# Patient Record
Sex: MALE | Race: WHITE | Employment: OTHER | URBAN - METROPOLITAN AREA
[De-identification: names, ages, dates, MRNs, and addresses within clinical notes are randomized per-mention and may not be internally consistent; named-entity substitution may affect disease eponyms.]

---

## 2021-10-06 ENCOUNTER — HOSPITAL ENCOUNTER (OUTPATIENT)
Age: 61
Setting detail: OBSERVATION
LOS: 1 days | Discharge: HOME | End: 2021-10-06
Attending: STUDENT IN AN ORGANIZED HEALTH CARE EDUCATION/TRAINING PROGRAM | Admitting: INTERNAL MEDICINE
Payer: COMMERCIAL

## 2021-10-06 ENCOUNTER — APPOINTMENT (OUTPATIENT)
Dept: NON INVASIVE DIAGNOSTICS | Age: 61
End: 2021-10-06
Attending: INTERNAL MEDICINE
Payer: COMMERCIAL

## 2021-10-06 ENCOUNTER — APPOINTMENT (OUTPATIENT)
Dept: MRI IMAGING | Age: 61
End: 2021-10-06
Attending: PSYCHIATRY & NEUROLOGY
Payer: COMMERCIAL

## 2021-10-06 ENCOUNTER — APPOINTMENT (OUTPATIENT)
Dept: GENERAL RADIOLOGY | Age: 61
End: 2021-10-06
Attending: PHYSICIAN ASSISTANT
Payer: COMMERCIAL

## 2021-10-06 ENCOUNTER — APPOINTMENT (OUTPATIENT)
Dept: GENERAL RADIOLOGY | Age: 61
End: 2021-10-06
Attending: INTERNAL MEDICINE
Payer: COMMERCIAL

## 2021-10-06 ENCOUNTER — APPOINTMENT (OUTPATIENT)
Dept: CT IMAGING | Age: 61
End: 2021-10-06
Attending: PHYSICIAN ASSISTANT
Payer: COMMERCIAL

## 2021-10-06 ENCOUNTER — APPOINTMENT (OUTPATIENT)
Dept: MRI IMAGING | Age: 61
End: 2021-10-06
Attending: STUDENT IN AN ORGANIZED HEALTH CARE EDUCATION/TRAINING PROGRAM
Payer: COMMERCIAL

## 2021-10-06 VITALS
RESPIRATION RATE: 18 BRPM | SYSTOLIC BLOOD PRESSURE: 118 MMHG | OXYGEN SATURATION: 97 % | DIASTOLIC BLOOD PRESSURE: 79 MMHG | TEMPERATURE: 98.1 F | HEIGHT: 66 IN | BODY MASS INDEX: 32.14 KG/M2 | WEIGHT: 200 LBS | HEART RATE: 86 BPM

## 2021-10-06 PROBLEM — R29.898 LEFT ARM WEAKNESS: Status: ACTIVE | Noted: 2021-10-06

## 2021-10-06 PROBLEM — G45.9 TIA (TRANSIENT ISCHEMIC ATTACK): Status: RESOLVED | Noted: 2021-10-06 | Resolved: 2021-10-06

## 2021-10-06 PROBLEM — G45.9 TIA (TRANSIENT ISCHEMIC ATTACK): Status: ACTIVE | Noted: 2021-10-06

## 2021-10-06 LAB
ALBUMIN SERPL-MCNC: 3.6 G/DL (ref 3.4–5)
ALBUMIN/GLOB SERPL: 1 {RATIO} (ref 0.8–1.7)
ALP SERPL-CCNC: 50 U/L (ref 45–117)
ALT SERPL-CCNC: 22 U/L (ref 16–61)
ANION GAP SERPL CALC-SCNC: 6 MMOL/L (ref 3–18)
AST SERPL-CCNC: 16 U/L (ref 10–38)
ATRIAL RATE: 101 BPM
BASOPHILS # BLD: 0.1 K/UL (ref 0–0.1)
BASOPHILS NFR BLD: 1 % (ref 0–2)
BILIRUB SERPL-MCNC: 0.3 MG/DL (ref 0.2–1)
BUN SERPL-MCNC: 17 MG/DL (ref 7–18)
BUN/CREAT SERPL: 13 (ref 12–20)
CALCIUM SERPL-MCNC: 9.2 MG/DL (ref 8.5–10.1)
CALCULATED R AXIS, ECG10: 6 DEGREES
CALCULATED T AXIS, ECG11: 20 DEGREES
CHLORIDE SERPL-SCNC: 102 MMOL/L (ref 100–111)
CK MB CFR SERPL CALC: 1.5 % (ref 0–4)
CK MB SERPL-MCNC: 2 NG/ML (ref 5–25)
CK SERPL-CCNC: 130 U/L (ref 39–308)
CO2 SERPL-SCNC: 31 MMOL/L (ref 21–32)
CREAT SERPL-MCNC: 1.26 MG/DL (ref 0.6–1.3)
DIAGNOSIS, 93000: NORMAL
DIFFERENTIAL METHOD BLD: ABNORMAL
EOSINOPHIL # BLD: 0.3 K/UL (ref 0–0.4)
EOSINOPHIL NFR BLD: 4 % (ref 0–5)
ERYTHROCYTE [DISTWIDTH] IN BLOOD BY AUTOMATED COUNT: 12.4 % (ref 11.6–14.5)
GLOBULIN SER CALC-MCNC: 3.6 G/DL (ref 2–4)
GLUCOSE BLD STRIP.AUTO-MCNC: 112 MG/DL (ref 70–110)
GLUCOSE SERPL-MCNC: 107 MG/DL (ref 74–99)
HCT VFR BLD AUTO: 46.4 % (ref 36–48)
HGB BLD-MCNC: 16 G/DL (ref 13–16)
INR PPP: 1.1 (ref 0.8–1.2)
LYMPHOCYTES # BLD: 2 K/UL (ref 0.9–3.6)
LYMPHOCYTES NFR BLD: 32 % (ref 21–52)
MCH RBC QN AUTO: 32.1 PG (ref 24–34)
MCHC RBC AUTO-ENTMCNC: 34.5 G/DL (ref 31–37)
MCV RBC AUTO: 93 FL (ref 78–100)
MONOCYTES # BLD: 0.7 K/UL (ref 0.05–1.2)
MONOCYTES NFR BLD: 11 % (ref 3–10)
NEUTS SEG # BLD: 3.4 K/UL (ref 1.8–8)
NEUTS SEG NFR BLD: 53 % (ref 40–73)
PLATELET # BLD AUTO: 276 K/UL (ref 135–420)
PMV BLD AUTO: 9.7 FL (ref 9.2–11.8)
POTASSIUM SERPL-SCNC: 4 MMOL/L (ref 3.5–5.5)
PROT SERPL-MCNC: 7.2 G/DL (ref 6.4–8.2)
PROTHROMBIN TIME: 14 SEC (ref 11.5–15.2)
Q-T INTERVAL, ECG07: 330 MS
QRS DURATION, ECG06: 82 MS
QTC CALCULATION (BEZET), ECG08: 416 MS
RBC # BLD AUTO: 4.99 M/UL (ref 4.35–5.65)
SODIUM SERPL-SCNC: 139 MMOL/L (ref 136–145)
TROPONIN I SERPL-MCNC: <0.02 NG/ML (ref 0–0.04)
VENTRICULAR RATE, ECG03: 96 BPM
WBC # BLD AUTO: 6.4 K/UL (ref 4.6–13.2)

## 2021-10-06 PROCEDURE — 96376 TX/PRO/DX INJ SAME DRUG ADON: CPT

## 2021-10-06 PROCEDURE — 99219 PR INITIAL OBSERVATION CARE/DAY 50 MINUTES: CPT | Performed by: INTERNAL MEDICINE

## 2021-10-06 PROCEDURE — 99285 EMERGENCY DEPT VISIT HI MDM: CPT

## 2021-10-06 PROCEDURE — 99218 HC RM OBSERVATION: CPT

## 2021-10-06 PROCEDURE — 71045 X-RAY EXAM CHEST 1 VIEW: CPT

## 2021-10-06 PROCEDURE — 72125 CT NECK SPINE W/O DYE: CPT

## 2021-10-06 PROCEDURE — 99218 PR INITIAL OBSERVATION CARE/DAY 30 MINUTES: CPT | Performed by: PSYCHIATRY & NEUROLOGY

## 2021-10-06 PROCEDURE — 74011250636 HC RX REV CODE- 250/636: Performed by: PSYCHIATRY & NEUROLOGY

## 2021-10-06 PROCEDURE — 82553 CREATINE MB FRACTION: CPT

## 2021-10-06 PROCEDURE — 99239 HOSP IP/OBS DSCHRG MGMT >30: CPT | Performed by: INTERNAL MEDICINE

## 2021-10-06 PROCEDURE — 82962 GLUCOSE BLOOD TEST: CPT

## 2021-10-06 PROCEDURE — 73030 X-RAY EXAM OF SHOULDER: CPT

## 2021-10-06 PROCEDURE — 72141 MRI NECK SPINE W/O DYE: CPT

## 2021-10-06 PROCEDURE — 80053 COMPREHEN METABOLIC PANEL: CPT

## 2021-10-06 PROCEDURE — 74011250636 HC RX REV CODE- 250/636: Performed by: ORTHOPAEDIC SURGERY

## 2021-10-06 PROCEDURE — 85610 PROTHROMBIN TIME: CPT

## 2021-10-06 PROCEDURE — 70450 CT HEAD/BRAIN W/O DYE: CPT

## 2021-10-06 PROCEDURE — 74011000250 HC RX REV CODE- 250: Performed by: INTERNAL MEDICINE

## 2021-10-06 PROCEDURE — 93306 TTE W/DOPPLER COMPLETE: CPT

## 2021-10-06 PROCEDURE — 70551 MRI BRAIN STEM W/O DYE: CPT

## 2021-10-06 PROCEDURE — 96375 TX/PRO/DX INJ NEW DRUG ADDON: CPT

## 2021-10-06 PROCEDURE — 85025 COMPLETE CBC W/AUTO DIFF WBC: CPT

## 2021-10-06 PROCEDURE — 93005 ELECTROCARDIOGRAM TRACING: CPT

## 2021-10-06 RX ORDER — LIDOCAINE 4 G/100G
PATCH TOPICAL
Qty: 5 PATCH | Refills: 0 | Status: SHIPPED | OUTPATIENT
Start: 2021-10-06 | End: 2021-10-20

## 2021-10-06 RX ORDER — DEXAMETHASONE SODIUM PHOSPHATE 4 MG/ML
6 INJECTION, SOLUTION INTRA-ARTICULAR; INTRALESIONAL; INTRAMUSCULAR; INTRAVENOUS; SOFT TISSUE ONCE
Status: COMPLETED | OUTPATIENT
Start: 2021-10-06 | End: 2021-10-06

## 2021-10-06 RX ORDER — GUAIFENESIN 100 MG/5ML
81 LIQUID (ML) ORAL DAILY
Status: DISCONTINUED | OUTPATIENT
Start: 2021-10-07 | End: 2021-10-06 | Stop reason: HOSPADM

## 2021-10-06 RX ORDER — ACETAMINOPHEN 325 MG/1
650 TABLET ORAL
Status: DISCONTINUED | OUTPATIENT
Start: 2021-10-06 | End: 2021-10-06 | Stop reason: HOSPADM

## 2021-10-06 RX ORDER — SODIUM CHLORIDE 9 MG/ML
10 INJECTION INTRAMUSCULAR; INTRAVENOUS; SUBCUTANEOUS
Status: COMPLETED | OUTPATIENT
Start: 2021-10-06 | End: 2021-10-06

## 2021-10-06 RX ORDER — ATORVASTATIN CALCIUM 10 MG/1
10 TABLET, FILM COATED ORAL
Status: DISCONTINUED | OUTPATIENT
Start: 2021-10-06 | End: 2021-10-06 | Stop reason: HOSPADM

## 2021-10-06 RX ORDER — SODIUM CHLORIDE 0.9 % (FLUSH) 0.9 %
5-40 SYRINGE (ML) INJECTION EVERY 8 HOURS
Status: DISCONTINUED | OUTPATIENT
Start: 2021-10-06 | End: 2021-10-06 | Stop reason: HOSPADM

## 2021-10-06 RX ORDER — DEXAMETHASONE 4 MG/1
4 TABLET ORAL 2 TIMES DAILY WITH MEALS
Qty: 10 TABLET | Refills: 0 | Status: SHIPPED | OUTPATIENT
Start: 2021-10-06 | End: 2021-10-20

## 2021-10-06 RX ORDER — ONDANSETRON 4 MG/1
4 TABLET, ORALLY DISINTEGRATING ORAL
Status: DISCONTINUED | OUTPATIENT
Start: 2021-10-06 | End: 2021-10-06 | Stop reason: HOSPADM

## 2021-10-06 RX ORDER — SODIUM CHLORIDE 0.9 % (FLUSH) 0.9 %
5-40 SYRINGE (ML) INJECTION AS NEEDED
Status: DISCONTINUED | OUTPATIENT
Start: 2021-10-06 | End: 2021-10-06 | Stop reason: HOSPADM

## 2021-10-06 RX ORDER — LIDOCAINE 4 G/100G
1 PATCH TOPICAL EVERY 24 HOURS
Status: DISCONTINUED | OUTPATIENT
Start: 2021-10-06 | End: 2021-10-06 | Stop reason: HOSPADM

## 2021-10-06 RX ORDER — ACETAMINOPHEN 650 MG/1
650 SUPPOSITORY RECTAL
Status: DISCONTINUED | OUTPATIENT
Start: 2021-10-06 | End: 2021-10-06 | Stop reason: HOSPADM

## 2021-10-06 RX ORDER — ONDANSETRON 2 MG/ML
4 INJECTION INTRAMUSCULAR; INTRAVENOUS
Status: DISCONTINUED | OUTPATIENT
Start: 2021-10-06 | End: 2021-10-06 | Stop reason: HOSPADM

## 2021-10-06 RX ORDER — DEXAMETHASONE SODIUM PHOSPHATE 4 MG/ML
6 INJECTION, SOLUTION INTRA-ARTICULAR; INTRALESIONAL; INTRAMUSCULAR; INTRAVENOUS; SOFT TISSUE EVERY 6 HOURS
Status: DISCONTINUED | OUTPATIENT
Start: 2021-10-06 | End: 2021-10-06 | Stop reason: HOSPADM

## 2021-10-06 RX ORDER — ENOXAPARIN SODIUM 100 MG/ML
40 INJECTION SUBCUTANEOUS DAILY
Status: DISCONTINUED | OUTPATIENT
Start: 2021-10-07 | End: 2021-10-06 | Stop reason: HOSPADM

## 2021-10-06 RX ORDER — POLYETHYLENE GLYCOL 3350 17 G/17G
17 POWDER, FOR SOLUTION ORAL DAILY PRN
Status: DISCONTINUED | OUTPATIENT
Start: 2021-10-06 | End: 2021-10-06 | Stop reason: HOSPADM

## 2021-10-06 RX ADMIN — DEXAMETHASONE SODIUM PHOSPHATE 6 MG: 4 INJECTION, SOLUTION INTRAMUSCULAR; INTRAVENOUS at 14:11

## 2021-10-06 RX ADMIN — SODIUM CHLORIDE 10 ML: 9 INJECTION INTRAMUSCULAR; INTRAVENOUS; SUBCUTANEOUS at 16:22

## 2021-10-06 RX ADMIN — DEXAMETHASONE SODIUM PHOSPHATE 6 MG: 4 INJECTION, SOLUTION INTRAMUSCULAR; INTRAVENOUS at 11:35

## 2021-10-06 NOTE — ED NOTES
L shoulder pain, L arm numbness/tingling, reduced ROM of joint. Denies dizziness, changes in vision, chest pain, shortness of breath, lower extremity weakness/numbness or tingling. Alert and oriented x 3, no slurred speech/facial droop.  strength 5/5. I performed a brief evaluation, including history and physical, of the patient here in triage and I have determined that pt will need further treatment and evaluation from the main side ER physician. I have placed initial orders to help in expediting patients care.      October 06, 2021 at 12:55 AM - JUVENTINO Rodriguez        Visit Vitals  BP (!) 139/101   Pulse (!) 105   Temp 98 °F (36.7 °C)   Resp 18   Ht 5' 6\" (1.676 m)   Wt 90.7 kg (200 lb)   SpO2 100%   BMI 32.28 kg/m²

## 2021-10-06 NOTE — Clinical Note
Status[de-identified] INPATIENT [101]   Type of Bed: Telemetry [19]   Cardiac Monitoring Required?: No   Inpatient Hospitalization Certified Necessary for the Following Reasons: 9.  Other (further clarification in H&P documentation)   Admitting Diagnosis: Left arm weakness [[id number]]   Admitting Physician: Victor Manuel Palomino [[id number]]   Attending Physician: Victor Manuel Palomino [[id number]]   Estimated Length of Stay: 2 Midnights   Discharge Plan[de-identified] Other (Specify)

## 2021-10-06 NOTE — PROGRESS NOTES
MRI Screening form needs to be filled out and faxed to [phone] MRI can be scheduled. If unable to obtain information from pt, [facility] needs to be contacted.  If pt is claustro or will need pain meds, please have ordered in advance in order to facilitate exam.

## 2021-10-06 NOTE — PROGRESS NOTES
completed the initial Spiritual Assessment of the patient in bed 5 of the emergency room and offered Pastoral Care support to him and family member that was present. Patient does not have an advance directive at this time. . Patient thought that he was going home later today after a test.Patient does not have any Yarsanism/cultural needs that will affect patients preferences in health care. Chaplains will continue to follow and will provide pastoral care on an as needed/requested basis.     [facility] Care Department  [phone]

## 2021-10-06 NOTE — ED TRIAGE NOTES
Pt states he started having L arm pain x 1 hour 20 minutes. Pt states pain subsided but is unable to fully lift arm. Pt states he also feels tingling in his fingers. Pt otherwise neurologically intact. Evaluated by JUVENTINO Rose. PT does take Eliquis. POC glucose 112mg/dl.

## 2021-10-06 NOTE — H&P
GENERAL GENERIC H&P/CONSULT    CC Left arm pain    Subjective: This is a 67-year-old male presenting for left arm pain. Patient has past medical history of hypertension paroxysmal atrial fibrillation and hyperlipidemia. Questionable as to whether this was weakness versus pain but symptoms were acute onset with concern for stroke. On arrival to the emergency department pain has resolved. Teleneuro consulted and recommending full stroke work-up inpatient    Past Medical History:   Diagnosis Date    Arthropathy     C6-7 disc bulge    Cardiac echocardiogram 06/13/2016    EF 55-60%, normal left atrial size, aortic valve sclerosis    Cardiac Holter monitor, abnormal 06/27/2016    Rhythm is atrial fibrillation. Avg HR 68 bpm (range  bpm). Normal QRS.  Dyslipidemia     HTN (hypertension)     Neck pain     PARISH (obstructive sleep apnea)     PAF (paroxysmal atrial fibrillation) (HCC)     Right arm pain     and weakness    Spinal stenosis       Past Surgical History:   Procedure Laterality Date    HX KNEE ARTHROSCOPY Left       Prior to Admission medications    Medication Sig Start Date End Date Taking? Authorizing Provider   testosterone cypionate (DEPOTESTOTERONE CYPIONATE) 200 mg/mL injection every month. 7/28/16   Provider, Historical   hydrALAZINE (APRESOLINE) 25 mg tablet two (2) times a day. 7/18/16   Provider, Historical   aspirin 81 mg chewable tablet Take 1 Tab by mouth daily. 6/13/16   Mariluz Darnell MD   digoxin (LANOXIN) 0.25 mg tablet Take 1 Tab by mouth daily. 6/13/16   Mariluz Darnell MD   nebivolol (BYSTOLIC) 10 mg tablet Take 1 Tab by mouth two (2) times a day. 6/13/16   Mariluz Darnell MD   spironolactone (ALDACTONE) 25 mg tablet Take 1 Tab by mouth daily. 6/13/16   Mariluz Darnell MD   valsartan (DIOVAN) 160 mg tablet Take 1 Tab by mouth daily. 6/13/16   Mariluz Darnell MD   therapeutic multivitamin [facility]) tablet Take 1 Tab by mouth daily.  6/13/16   Mariluz Darnell MD DOCOSAHEXANOIC ACID/EPA (FISH OIL PO) Take  by mouth. Provider, Historical   simvastatin (ZOCOR) 10 mg tablet Take  by mouth nightly. Provider, Historical   sertraline (ZOLOFT) 25 mg tablet Take  by mouth daily. Provider, Historical   zolpidem (AMBIEN) 10 mg tablet Take  by mouth nightly as needed for Sleep. Provider, Historical   CIALIS 20 mg tablet  3/18/14   Provider, Historical     Allergies   Allergen Reactions    Norvasc [Amlodipine] Swelling     Severe LE edema    Losartan Swelling      Social History     Tobacco Use    Smoking status: Never Smoker    Smokeless tobacco: Current User   Substance Use Topics    Alcohol use: Yes      Family History   Problem Relation Age of Onset    Cancer Mother         bone    Cancer Father         lung    Cancer Brother         leukemia      Review of Systems   Constitutional: Positive for activity change. HENT: Negative for congestion. Eyes: Negative for discharge. Respiratory: Negative for apnea, chest tightness and shortness of breath. Cardiovascular: Negative for chest pain and palpitations. Gastrointestinal: Negative for abdominal distention. Endocrine: Negative for cold intolerance. Genitourinary: Negative for difficulty urinating. Musculoskeletal: Negative for arthralgias. Left arm pain   Skin: Negative for color change. Neurological: Positive for weakness and headaches. Hematological: Negative for adenopathy. Psychiatric/Behavioral: Negative for agitation. Objective:    No intake/output data recorded. No intake/output data recorded. Patient Vitals for the past 8 hrs:   BP Temp Pulse Resp SpO2 Height Weight   10/06/21 0052 (!) 139/101 98 °F (36.7 °C) (!) 105 18 100 % 5' 6\" (1.676 m) 90.7 kg (200 lb)     Physical Exam  HENT:      Head: Normocephalic.       Right Ear: Tympanic membrane normal.      Nose: Nose normal.      Mouth/Throat:      Mouth: Mucous membranes are moist.   Eyes:      Pupils: Pupils are equal, round, and reactive to light. Cardiovascular:      Rate and Rhythm: Tachycardia present. Pulses: Normal pulses. Pulmonary:      Effort: Pulmonary effort is normal.   Abdominal:      General: Abdomen is flat. There is no distension. Tenderness: There is no abdominal tenderness. Genitourinary:     Comments: deferred  Musculoskeletal:         General: Normal range of motion. Cervical back: Normal range of motion. Skin:     General: Skin is warm. Neurological:      General: No focal deficit present. Mental Status: He is alert. Psychiatric:         Mood and Affect: Mood normal.          Labs:    Recent Results (from the past 24 hour(s))   GLUCOSE, POC    Collection Time: 10/06/21  1:01 AM   Result Value Ref Range    Glucose (POC) 112 (H) 70 - 110 mg/dL   CBC WITH AUTOMATED DIFF    Collection Time: 10/06/21  1:21 AM   Result Value Ref Range    WBC 6.4 4.6 - 13.2 K/uL    RBC 4.99 4.35 - 5.65 M/uL    HGB 16.0 13.0 - 16.0 g/dL    HCT 46.4 36.0 - 48.0 %    MCV 93.0 78.0 - 100.0 FL    MCH 32.1 24.0 - 34.0 PG    MCHC 34.5 31.0 - 37.0 g/dL    RDW 12.4 11.6 - 14.5 %    PLATELET 876 200 - 012 K/uL    MPV 9.7 9.2 - 11.8 FL    NEUTROPHILS 53 40 - 73 %    LYMPHOCYTES 32 21 - 52 %    MONOCYTES 11 (H) 3 - 10 %    EOSINOPHILS 4 0 - 5 %    BASOPHILS 1 0 - 2 %    ABS. NEUTROPHILS 3.4 1.8 - 8.0 K/UL    ABS. LYMPHOCYTES 2.0 0.9 - 3.6 K/UL    ABS. MONOCYTES 0.7 0.05 - 1.2 K/UL    ABS. EOSINOPHILS 0.3 0.0 - 0.4 K/UL    ABS.  BASOPHILS 0.1 0.0 - 0.1 K/UL    DF AUTOMATED     METABOLIC PANEL, COMPREHENSIVE    Collection Time: 10/06/21  1:21 AM   Result Value Ref Range    Sodium 139 136 - 145 mmol/L    Potassium 4.0 3.5 - 5.5 mmol/L    Chloride 102 100 - 111 mmol/L    CO2 31 21 - 32 mmol/L    Anion gap 6 3.0 - 18 mmol/L    Glucose 107 (H) 74 - 99 mg/dL    BUN 17 7.0 - 18 MG/DL    Creatinine 1.26 0.6 - 1.3 MG/DL    BUN/Creatinine ratio 13 12 - 20      GFR est AA >60 >60 ml/min/1.73m2    GFR est non-AA 58 (L) >60 ml/min/1.73m2    Calcium 9.2 8.5 - 10.1 MG/DL    Bilirubin, total 0.3 0.2 - 1.0 MG/DL    ALT (SGPT) 22 16 - 61 U/L    AST (SGOT) 16 10 - 38 U/L    Alk.  phosphatase 50 45 - 117 U/L    Protein, total 7.2 6.4 - 8.2 g/dL    Albumin 3.6 3.4 - 5.0 g/dL    Globulin 3.6 2.0 - 4.0 g/dL    A-G Ratio 1.0 0.8 - 1.7     CARDIAC PANEL,(CK, CKMB & TROPONIN)    Collection Time: 10/06/21  1:21 AM   Result Value Ref Range    CK - MB 2.0 <3.6 ng/ml    CK-MB Index 1.5 0.0 - 4.0 %     39 - 308 U/L    Troponin-I, QT <0.02 0.0 - 0.045 NG/ML   PROTHROMBIN TIME + INR    Collection Time: 10/06/21  1:21 AM   Result Value Ref Range    Prothrombin time 14.0 11.5 - 15.2 sec    INR 1.1 0.8 - 1.2         ECG: pending    Chest X-Ray: pending    Assessment:  Principal Problem:    Left arm weakness (10/6/2021)    Active Problems:    Atrial fibrillation ([address] (6/12/2016)      Essential hypertension with goal blood pressure less than 140/90 (8/18/2016)      Dyslipidemia (8/18/2016)      PARISH (obstructive sleep apnea) (8/18/2016)        Plan:  F/U Neurology  MRI in the AM  Echo with bubble study and Carotid [place]  q4hr Neurochecks  Daily Aspirin and nightly statin  Bedside Swallow  PT/OT  Permissive Hypertension--Hold BP meds for 24hrs  EKG and Chest xray  Troponin and prolactin to rule out seizure    GLOBAL:  Admit to: medical floor with telemetry  Cardiac Diet  DVT PPX: lovenox 40mg qD  Full Code  Tylenol/NSAID for pain  Optional Inpatient Sleep Regimen  Anticipate DC 1-2 days    Signed:  Mago Combs MD 10/6/2021

## 2021-10-06 NOTE — CONSULTS
NEUROLOGY CONSULT NOTE    Patient ID:  Anaid Garcia  [id number]  52 y.o.  1960    Date of Consultation:  October 6, 2021    Referring Physician: Dr Dwight Munguia    Reason for Consultation:  Left arm weakness and pain        Subjective:       History of Present Illness: This is a very pleasant LHD WM who was admitted with new onset left radiating arm pain and isolated left shoulder weakness. He also has known documented cervical stenosis as well as atrial fibrillation. He awoke yesterday with pain radiating from his neck down his left arm and noticed he could not raise his left arm. He was worked up as a stroke per teleneurology direction and his brain MRI was negative. He remains in the ER with severe arm weakness. He states he has had known dx of a \"pinched nerve\" but it was never this bad before. He denies any lower extremity symptoms or bowel/bladder dysfunction. Patient Active Problem List    Diagnosis Date Noted    Left arm weakness 10/06/2021    TIA (transient ischemic attack) 10/06/2021    Essential hypertension with goal blood pressure less than 140/90 08/18/2016    Dyslipidemia 08/18/2016    PARISH (obstructive sleep apnea) 75/06/9621    Diastolic CHF, chronic (HCC) 08/18/2016    Atrial fibrillation ([address] 06/12/2016    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 05/08/2014    Spinal stenosis in cervical region 05/08/2014     Past Medical History:   Diagnosis Date    Arthropathy     C6-7 disc bulge    Cardiac echocardiogram 06/13/2016    EF 55-60%, normal left atrial size, aortic valve sclerosis    Cardiac Holter monitor, abnormal 06/27/2016    Rhythm is atrial fibrillation. Avg HR 68 bpm (range  bpm). Normal QRS.     Dyslipidemia     HTN (hypertension)     Neck pain     PARISH (obstructive sleep apnea)     PAF (paroxysmal atrial fibrillation) (HCC)     Right arm pain     and weakness    Spinal stenosis       Past Surgical History:   Procedure Laterality Date  HX KNEE ARTHROSCOPY Left       Prior to Admission medications    Medication Sig Start Date End Date Taking? Authorizing Provider   testosterone cypionate (DEPOTESTOTERONE CYPIONATE) 200 mg/mL injection every month. 7/28/16   Provider, Historical   hydrALAZINE (APRESOLINE) 25 mg tablet two (2) times a day. 7/18/16   Provider, Historical   aspirin 81 mg chewable tablet Take 1 Tab by mouth daily. 6/13/16   Kam Lim MD   digoxin (LANOXIN) 0.25 mg tablet Take 1 Tab by mouth daily. 6/13/16   Kam Lim MD   nebivolol (BYSTOLIC) 10 mg tablet Take 1 Tab by mouth two (2) times a day. 6/13/16   Kam Lim MD   spironolactone (ALDACTONE) 25 mg tablet Take 1 Tab by mouth daily. 6/13/16   Kam Lim MD   valsartan (DIOVAN) 160 mg tablet Take 1 Tab by mouth daily. 6/13/16   Kam Lim MD   therapeutic multivitamin [facility]) tablet Take 1 Tab by mouth daily. 6/13/16   Kam Lim MD   DOCOSAHEXANOIC ACID/EPA (FISH OIL PO) Take  by mouth. Provider, Historical   simvastatin (ZOCOR) 10 mg tablet Take  by mouth nightly. Provider, Historical   sertraline (ZOLOFT) 25 mg tablet Take  by mouth daily. Provider, Historical   zolpidem (AMBIEN) 10 mg tablet Take  by mouth nightly as needed for Sleep. Provider, Historical   CIALIS 20 mg tablet  3/18/14   Provider, Historical     Allergies   Allergen Reactions    Norvasc [Amlodipine] Swelling     Severe LE edema    Losartan Swelling      Social History     Tobacco Use    Smoking status: Never Smoker    Smokeless tobacco: Current User   Substance Use Topics    Alcohol use: Yes      Family History   Problem Relation Age of Onset    Cancer Mother         bone    Cancer Father         lung    Cancer Brother         leukemia        Review of Systems  Review of Systems   Constitutional: Positive for activity change. HENT: Negative for congestion. Eyes: Negative for discharge.    Respiratory: Negative for apnea, chest tightness and shortness of breath. Cardiovascular: Negative for chest pain and palpitations. Gastrointestinal: Negative for abdominal distention. Endocrine: Negative for cold intolerance. Genitourinary: Negative for difficulty urinating. Musculoskeletal: Negative for arthralgias. Left arm pain   Skin: Negative for color change. Neurological: Positive for weakness and headaches. Hematological: Negative for adenopathy. Psychiatric/Behavioral: Negative for agitation. Objective:     Patient Vitals for the past 8 hrs:   BP Pulse Resp SpO2   10/06/21 0845 107/74 85 20 95 %   10/06/21 0641  83 14 98 %   10/06/21 0614  94 20 97 %   10/06/21 0609  92 16 98 %   10/06/21 0259  85 15 96 %   10/06/21 0255  83 16 96 %   10/06/21 0248  87 12 96 %       General Exam  No acute distress, normal body habitus    HEENT: Normocephalic, atraumatic, Sclera anicteric, normal conjunctiva  Mucous membranes: normal color and hydration   MSK: positive spurlings test     Neurologic Exam:    Mental status:  Alert, oriented to person, place, time and circumstance  No visual spatial neglect or overt apraxia    Language: normal fluency and comprehension    Cranial nerves: PERRL, no arlene's. Extraocular movements intact and full, face symmetric to movement, Tongue midline with normal strength, palat symmetric    Motor: strength 5/5 throughout except left deltoid <3.  Left bicep <3  No abnormal movements    DTRs (R/L)  Biceps: (2/1)  Brachorad (1/0)  Patellar (3+/3)  Ankles (3/3)      Sensation: He is dysesthetic in the left C5 dermatome      Data Review:    Recent Results (from the past 24 hour(s))   GLUCOSE, POC    Collection Time: 10/06/21  1:01 AM   Result Value Ref Range    Glucose (POC) 112 (H) 70 - 110 mg/dL   CBC WITH AUTOMATED DIFF    Collection Time: 10/06/21  1:21 AM   Result Value Ref Range    WBC 6.4 4.6 - 13.2 K/uL    RBC 4.99 4.35 - 5.65 M/uL    HGB 16.0 13.0 - 16.0 g/dL    HCT 46.4 36.0 - 48.0 %    MCV 93.0 78.0 - 100.0 FL MCH 32.1 24.0 - 34.0 PG    MCHC 34.5 31.0 - 37.0 g/dL    RDW 12.4 11.6 - 14.5 %    PLATELET 986 845 - 072 K/uL    MPV 9.7 9.2 - 11.8 FL    NEUTROPHILS 53 40 - 73 %    LYMPHOCYTES 32 21 - 52 %    MONOCYTES 11 (H) 3 - 10 %    EOSINOPHILS 4 0 - 5 %    BASOPHILS 1 0 - 2 %    ABS. NEUTROPHILS 3.4 1.8 - 8.0 K/UL    ABS. LYMPHOCYTES 2.0 0.9 - 3.6 K/UL    ABS. MONOCYTES 0.7 0.05 - 1.2 K/UL    ABS. EOSINOPHILS 0.3 0.0 - 0.4 K/UL    ABS. BASOPHILS 0.1 0.0 - 0.1 K/UL    DF AUTOMATED     METABOLIC PANEL, COMPREHENSIVE    Collection Time: 10/06/21  1:21 AM   Result Value Ref Range    Sodium 139 136 - 145 mmol/L    Potassium 4.0 3.5 - 5.5 mmol/L    Chloride 102 100 - 111 mmol/L    CO2 31 21 - 32 mmol/L    Anion gap 6 3.0 - 18 mmol/L    Glucose 107 (H) 74 - 99 mg/dL    BUN 17 7.0 - 18 MG/DL    Creatinine 1.26 0.6 - 1.3 MG/DL    BUN/Creatinine ratio 13 12 - 20      GFR est AA >60 >60 ml/min/1.73m2    GFR est non-AA 58 (L) >60 ml/min/1.73m2    Calcium 9.2 8.5 - 10.1 MG/DL    Bilirubin, total 0.3 0.2 - 1.0 MG/DL    ALT (SGPT) 22 16 - 61 U/L    AST (SGOT) 16 10 - 38 U/L    Alk.  phosphatase 50 45 - 117 U/L    Protein, total 7.2 6.4 - 8.2 g/dL    Albumin 3.6 3.4 - 5.0 g/dL    Globulin 3.6 2.0 - 4.0 g/dL    A-G Ratio 1.0 0.8 - 1.7     CARDIAC PANEL,(CK, CKMB & TROPONIN)    Collection Time: 10/06/21  1:21 AM   Result Value Ref Range    CK - MB 2.0 <3.6 ng/ml    CK-MB Index 1.5 0.0 - 4.0 %     39 - 308 U/L    Troponin-I, QT <0.02 0.0 - 0.045 NG/ML   PROTHROMBIN TIME + INR    Collection Time: 10/06/21  1:21 AM   Result Value Ref Range    Prothrombin time 14.0 11.5 - 15.2 sec    INR 1.1 0.8 - 1.2     EKG, 12 LEAD, INITIAL    Collection Time: 10/06/21  1:50 AM   Result Value Ref Range    Ventricular Rate 96 BPM    Atrial Rate 101 BPM    QRS Duration 82 ms    Q-T Interval 330 ms    QTC Calculation (Bezet) 416 ms    Calculated R Axis 6 degrees    Calculated T Axis 20 degrees    Diagnosis       Poor data quality, interpretation may be adversely affected  Atrial fibrillation  Abnormal ECG  When compared with ECG of 12-JUN-2016 01:01,  No significant change was found           Radiology studies: MRI brain reviewed and is negative for stroke      Assessment: This is a 57 y/o WM with known prior cervical stenosis who awoke with radiating left pain from his neck and developed severe dominant left arn weakness involving the biceps and deltoid. He also has radicular symptoms and dysesthesia in the C5 dermatome. This is classic for an acute c5 radiculopathy. He also has relatively brisk patellar reflexes which suggests he also may have a component of a cervical myelopathy. Principal Problem:    Left arm weakness (10/6/2021)    Active Problems:    Atrial fibrillation ([address] (6/12/2016)      Essential hypertension with goal blood pressure less than 140/90 (8/18/2016)      Dyslipidemia (8/18/2016)      PARISH (obstructive sleep apnea) (8/18/2016)      TIA (transient ischemic attack) (10/6/2021)        Plan:     1. Stat MRI c-spine  2. Decadron 6mg IV x 1  3. Will likely need spine surgery consult        Anette Canavan M.D.   Clinical Neurophysiology  Neuromuscular specialist

## 2021-10-06 NOTE — CONSULTS
Chart reviewed consult received. Patient presented with arm pain and numbness and was worked up as stroke. This would be an extremely unusual presentation for a stroke and I have discontinued all stroke related orders. No need for NIHSS or other neuro checks. I will see patient later this morning. Sandi Osborne.  [address]. Neurophysiology  Neuromuscular Medicine

## 2021-10-06 NOTE — DISCHARGE SUMMARY
Physician Discharge Summary       Patient: Shireen Sorto MRN: [id number]  SSN: [id number]    YOB: 1960  Age: 56 y.o. Sex: male    PCP: Olimpia Barba MD    Allergies: Norvasc [amlodipine] and Losartan    Admit date: 10/6/2021  Admitting Provider: Fredy Leija MD    Discharge date: 10/6/2021  Discharging Provider: Kwadwo Mackey MD    * Admission Diagnoses: Left arm weakness [R29.898]  TIA (transient ischemic attack) [G45.9]    * Discharge Diagnoses:      1. Left arm pain and weakness due to C5 radiculopathy  2. Multilevel degenerative disc disease with central stenosis  3. Dyslipidemia  4. Hypertension  5. Paroxysmal atrial fibrillation  6. Obstructive sleep apnea    [facility] Course: The patient was admitted here with a diagnosis of left arm pain and weakness started last night. Patient had a CT head that was negative. MRI brain was negative. MRI C-spine showed progression of disc disease especially central stenosis. Neurology consulted they recommended spine surgery consult. Spine surgery saw this patient recommended steroid and patient can stay in house or go home with possible surgery in the next few days if his symptoms do not get better. Patient will be continued on his home medication for the comorbidities. Patient decided to go home and follow-up with Dr. Car Espino as an outpatient on Friday. Patient denies any chest pain abdominal pain. No nausea vomiting. Left arm pain is present. Neck pain off and on. No shortness of breath or cough.     * Procedures: None      Consults: Neurology and Orthopedic Surgery    Significant Diagnostic Studies: CT C-spine, CT head, MRI C-spine, MRI brain    Discharge Exam:    /84   Pulse 82   Temp 98 °F (36.7 °C)   Resp 18   Ht 5' 6\" (1.676 m)   Wt 90.7 kg (200 lb)   SpO2 97%   BMI 32.28 kg/m²       General appearance - alert, well appearing, and in no distress  Chest -clear air entry noted in bases, no wheezes  Heart - S1 and S2 normal  Abdomen - soft, nontender, nondistended, Bowel sounds present  Neurological - alert, oriented, normal speech, motor strength 3-4 out of 5 in left upper extremity however good handgrip. Musculoskeletal - no joint tenderness or swelling of knees bilaterally  Extremities - no pedal edema noted      * Discharge Condition: improved  * Disposition: Home    Discharge Medications:  Current Discharge Medication List      START taking these medications    Details   lidocaine 4 % patch Apply patch to neck area. Apply patch to the affected area for 12 hours a day and remove for 12 hours a day. Qty: 5 Patch, Refills: 0  Start date: 10/6/2021      dexAMETHasone (Decadron) 4 mg tablet Take 4 mg by mouth two (2) times daily (with meals). Qty: 10 Tablet, Refills: 0  Start date: 10/6/2021         CONTINUE these medications which have NOT CHANGED    Details   testosterone cypionate (DEPOTESTOTERONE CYPIONATE) 200 mg/mL injection every month. Refills: 0      hydrALAZINE (APRESOLINE) 25 mg tablet two (2) times a day. Refills: 0      aspirin 81 mg chewable tablet Take 1 Tab by mouth daily. Qty: 30 Tab, Refills: 1      digoxin (LANOXIN) 0.25 mg tablet Take 1 Tab by mouth daily. Qty: 30 Tab, Refills: 1      nebivolol (BYSTOLIC) 10 mg tablet Take 1 Tab by mouth two (2) times a day. Qty: 60 Tab, Refills: 1      spironolactone (ALDACTONE) 25 mg tablet Take 1 Tab by mouth daily. Qty: 30 Tab, Refills: 1      valsartan (DIOVAN) 160 mg tablet Take 1 Tab by mouth daily. Qty: 30 Tab, Refills: 1      therapeutic multivitamin (THERAGRAN) tablet Take 1 Tab by mouth daily. Qty: 30 Tab, Refills: 1      DOCOSAHEXANOIC ACID/EPA (FISH OIL PO) Take  by mouth. simvastatin (ZOCOR) 10 mg tablet Take  by mouth nightly. sertraline (ZOLOFT) 25 mg tablet Take  by mouth daily.          STOP taking these medications       zolpidem (AMBIEN) 10 mg tablet Comments:   Reason for Stopping:         CIALIS 20 mg tablet Comments:   Reason for Stopping:               * Follow-up Care/Patient Instructions: Activity: Activity as tolerated. No driving for next 2 days  Diet: Cardiac Diet  Wound Care: None needed    Follow-up Information     Follow up With Specialties Details Why Contact Info    Vimal Hairston MD Internal Medicine   [address] [place] [address]  [phone]          Follow-up Appointments   Procedures    FOLLOW UP VISIT Appointment in: Other (Specify) With dr. Sheila Delgado on Friday With PCP in 5-7 days     With dr. Sheila Delgado on Friday  With PCP in 5-7 days     Standing Status:   Standing     Number of Occurrences:   1     Order Specific Question:   Appointment in     Answer: Other (Specify)     Total time of discharge greater than 35 minutes    Disclaimer: Sections of this note are dictated using utilizing voice recognition software, which may have resulted in some phonetic based errors in grammar and contents. Even though attempts were made to correct all the mistakes, some may have been missed, and remained in the body of the document. If questions arise, please contact our department.     Signed:  Amanda Rosales MD  10/6/2021  3:22 PM

## 2021-10-07 ENCOUNTER — PATIENT OUTREACH (OUTPATIENT)
Dept: CASE MANAGEMENT | Age: 61
End: 2021-10-07

## 2021-10-07 LAB
ECHO AO ROOT DIAM: 3.35 CM
ECHO LA AREA 4C: 16.78 CM2
ECHO LA VOL 2C: 49.51 ML (ref 18–58)
ECHO LA VOL 4C: 40.47 ML (ref 18–58)
ECHO LA VOL BP: 47.44 ML (ref 18–58)
ECHO LA VOL/BSA BIPLANE: 23.72 ML/M2 (ref 16–28)
ECHO LA VOLUME INDEX A2C: 24.76 ML/M2 (ref 16–28)
ECHO LA VOLUME INDEX A4C: 20.24 ML/M2 (ref 16–28)
ECHO LV INTERNAL DIMENSION DIASTOLIC: 3.72 CM (ref 4.2–5.9)
ECHO LV INTERNAL DIMENSION SYSTOLIC: 3.15 CM
ECHO LV IVSD: 1.23 CM (ref 0.6–1)
ECHO LV MASS 2D: 135.7 G (ref 88–224)
ECHO LV MASS INDEX 2D: 67.8 G/M2 (ref 49–115)
ECHO LV POSTERIOR WALL DIASTOLIC: 1.03 CM (ref 0.6–1)
ECHO LVOT DIAM: 1.81 CM
ECHO LVOT PEAK GRADIENT: 5.44 MMHG
ECHO LVOT PEAK VELOCITY: 116.59 CM/S
ECHO LVOT SV: 54.2 ML
ECHO LVOT VTI: 21.18 CM
ECHO MV A VELOCITY: 107.41 CM/S
ECHO MV E DECELERATION TIME (DT): 53.21 MS
ECHO MV E VELOCITY: 35.15 CM/S
ECHO MV E/A RATIO: 0.33
ECHO TV REGURGITANT MAX VELOCITY: 230.82 CM/S
ECHO TV REGURGITANT PEAK GRADIENT: 21.31 MMHG
LVOT MG: 3.01 MMHG

## 2021-10-07 NOTE — PROGRESS NOTES
Unable to complete carotid ultrasound. Patient discharged from the ER. Please order exam as outpatient.

## 2021-10-08 ENCOUNTER — PATIENT OUTREACH (OUTPATIENT)
Dept: CASE MANAGEMENT | Age: 61
End: 2021-10-08

## 2021-10-13 ENCOUNTER — HOSPITAL ENCOUNTER (OUTPATIENT)
Dept: LAB | Age: 61
Discharge: HOME | End: 2021-10-13
Payer: COMMERCIAL

## 2021-10-13 ENCOUNTER — HOSPITAL ENCOUNTER (OUTPATIENT)
Dept: PREADMISSION TESTING | Age: 61
Discharge: HOME | End: 2021-10-13
Payer: COMMERCIAL

## 2021-10-13 ENCOUNTER — TRANSCRIBE ORDER (OUTPATIENT)
Dept: REGISTRATION | Age: 61
End: 2021-10-13

## 2021-10-13 DIAGNOSIS — M48.02 SPINAL STENOSIS IN CERVICAL REGION: Primary | ICD-10-CM

## 2021-10-13 DIAGNOSIS — M48.02 SPINAL STENOSIS IN CERVICAL REGION: ICD-10-CM

## 2021-10-13 LAB
CALCULATED R AXIS, ECG10: 48 DEGREES
CALCULATED T AXIS, ECG11: 36 DEGREES
DIAGNOSIS, 93000: NORMAL
Q-T INTERVAL, ECG07: 370 MS
QRS DURATION, ECG06: 84 MS
QTC CALCULATION (BEZET), ECG08: 384 MS
SENTARA SPECIMEN COL,SENBCF: NORMAL
VENTRICULAR RATE, ECG03: 65 BPM

## 2021-10-13 PROCEDURE — 99001 SPECIMEN HANDLING PT-LAB: CPT

## 2021-10-13 PROCEDURE — 93005 ELECTROCARDIOGRAM TRACING: CPT

## 2021-10-15 ENCOUNTER — OFFICE VISIT (OUTPATIENT)
Dept: CARDIOLOGY CLINIC | Age: 61
End: 2021-10-15
Payer: COMMERCIAL

## 2021-10-15 ENCOUNTER — PATIENT OUTREACH (OUTPATIENT)
Dept: CASE MANAGEMENT | Age: 61
End: 2021-10-15

## 2021-10-15 VITALS
HEIGHT: 66 IN | HEART RATE: 97 BPM | SYSTOLIC BLOOD PRESSURE: 120 MMHG | WEIGHT: 208 LBS | BODY MASS INDEX: 33.43 KG/M2 | DIASTOLIC BLOOD PRESSURE: 78 MMHG | OXYGEN SATURATION: 98 %

## 2021-10-15 DIAGNOSIS — E78.5 DYSLIPIDEMIA: ICD-10-CM

## 2021-10-15 DIAGNOSIS — I10 ESSENTIAL HYPERTENSION WITH GOAL BLOOD PRESSURE LESS THAN 140/90: ICD-10-CM

## 2021-10-15 DIAGNOSIS — G47.33 OSA (OBSTRUCTIVE SLEEP APNEA): ICD-10-CM

## 2021-10-15 DIAGNOSIS — I48.11 LONGSTANDING PERSISTENT ATRIAL FIBRILLATION (HCC): Primary | ICD-10-CM

## 2021-10-15 DIAGNOSIS — M48.02 SPINAL STENOSIS IN CERVICAL REGION: ICD-10-CM

## 2021-10-15 PROCEDURE — 99204 OFFICE O/P NEW MOD 45 MIN: CPT | Performed by: INTERNAL MEDICINE

## 2021-10-15 RX ORDER — OXYCODONE HYDROCHLORIDE 5 MG/1
TABLET ORAL
COMMUNITY
Start: 2021-10-08

## 2021-10-15 RX ORDER — APIXABAN 5 MG/1
5 TABLET, FILM COATED ORAL DAILY
COMMUNITY
Start: 2021-07-12

## 2021-10-15 RX ORDER — FENOFIBRATE 160 MG/1
TABLET ORAL
COMMUNITY
Start: 2021-08-29

## 2021-10-15 NOTE — PROGRESS NOTES
Shireen Sorto presents today for   Chief Complaint   Patient presents with    New Patient     referred by PCP for AFIB, also restablish care        Shireen Sorto preferred language for health care discussion is english/other. Is someone accompanying this pt? no    Is the patient using any DME equipment during [address]? no    Depression Screening:  3 most recent PHQ Screens 10/15/2021   Little interest or pleasure in doing things Not at all   Feeling down, depressed, irritable, or hopeless Not at all   Total Score PHQ 2 0       Learning Assessment:  Learning Assessment 10/15/2021   PRIMARY LEARNER Patient   PRIMARY LANGUAGE ENGLISH   LEARNER PREFERENCE PRIMARY DEMONSTRATION   ANSWERED BY Patient   RELATIONSHIP SELF       Abuse Screening:  Abuse Screening Questionnaire 10/15/2021   Do you ever feel afraid of your partner? N   Are you in a relationship with someone who physically or mentally threatens you? N   Is it safe for you to go home? Y     Pt currently taking Anticoagulant therapy? Eliquis     Coordination of Care:  1. Have you been to the ER, urgent care clinic since your last visit? Hospitalized since your last visit? no    2. Have you seen or consulted any other health care providers outside of the [address] [place] [address] since your last visit? Include any pap smears or colon screening.  no

## 2021-10-15 NOTE — PROGRESS NOTES
HISTORY OF PRESENT ILLNESS  Nic Barba is a 59 y.o. male. HPI    Patient presents for a new office visit. He has a past medical history significant for atrial fibrillation, which now sounds persistent in nature over the past 6 to 8 years. He also has a history of essential hypertension, dyslipidemia, obstructive sleep apnea, and more recently was diagnosed with cervical spinal stenosis with a left upper extremity radiculopathy which will require surgical decompression next month with Dr. Real Olmos. He was hospitalized briefly earlier this month with left upper extremity arm tingling and weakness, was ruled out for a TIA and was diagnosed with the radiculopathy. He underwent an echocardiogram during his hospital stay which showed preserved LV function, EF 55 to 60%, mild concentric LVH, no valvular heart disease and no evidence of an intra-atrial shunt. Patient states that he is asymptomatic to his atrial fibrillation has not noted any heart palpitations. No new shortness of breath, no exertional chest pain, no leg swelling, no orthopnea or PND. No major change in his activity tolerance over the past 6 to 12 months. Past Medical History:   Diagnosis Date    Arthropathy     C6-7 disc bulge    Cardiac echocardiogram 06/13/2016    EF 55-60%, normal left atrial size, aortic valve sclerosis    Cardiac Holter monitor, abnormal 06/27/2016    Rhythm is atrial fibrillation. Avg HR 68 bpm (range  bpm). Normal QRS.  Dyslipidemia     HTN (hypertension)     Neck pain     PARISH (obstructive sleep apnea)     PAF (paroxysmal atrial fibrillation) (HCC)     Right arm pain     and weakness    Spinal stenosis      Current Outpatient Medications   Medication Sig Dispense Refill    Eliquis 5 mg tablet Take 5 mg by mouth two (2) times a day.       fenofibrate (LOFIBRA) 160 mg tablet take 1 tablet by mouth once daily WITH A MEAL      oxyCODONE IR (ROXICODONE) 5 mg immediate release tablet take 1 tablet by mouth every 6 hours if needed for pain      lidocaine 4 % patch Apply patch to neck area. Apply patch to the affected area for 12 hours a day and remove for 12 hours a day. 5 Patch 0    dexAMETHasone (Decadron) 4 mg tablet Take 4 mg by mouth two (2) times daily (with meals). 10 Tablet 0    testosterone cypionate (DEPOTESTOTERONE CYPIONATE) 200 mg/mL injection every month.  0    hydrALAZINE (APRESOLINE) 25 mg tablet two (2) times a day.  0    digoxin (LANOXIN) 0.25 mg tablet Take 1 Tab by mouth daily. 30 Tab 1    nebivolol (BYSTOLIC) 10 mg tablet Take 1 Tab by mouth two (2) times a day. (Patient taking differently: Take 10 mg by mouth daily.) 60 Tab 1    spironolactone (ALDACTONE) 25 mg tablet Take 1 Tab by mouth daily. 30 Tab 1    valsartan (DIOVAN) 160 mg tablet Take 1 Tab by mouth daily. 30 Tab 1    simvastatin (ZOCOR) 10 mg tablet Take  by mouth nightly.  sertraline (ZOLOFT) 25 mg tablet Take  by mouth daily. Allergies   Allergen Reactions    Norvasc [Amlodipine] Swelling     Severe LE edema    Losartan Swelling      Social History     Tobacco Use    Smoking status: Never Smoker    Smokeless tobacco: Current User   Substance Use Topics    Alcohol use: Yes    Drug use: Not on file     Family History   Problem Relation Age of Onset    Cancer Mother         bone    Cancer Father         lung    Cancer Brother         leukemia         Review of Systems   Constitutional: Negative for chills, fever and weight loss. HENT: Negative for nosebleeds. Eyes: Negative for blurred vision and double vision. Respiratory: Negative for cough, shortness of breath and wheezing. Cardiovascular: Negative for chest pain, palpitations, orthopnea, claudication, leg swelling and PND. Gastrointestinal: Negative for abdominal pain, heartburn, nausea and vomiting. Genitourinary: Negative for dysuria and hematuria. Musculoskeletal: Negative for falls and myalgias. Skin: Negative for rash. Neurological: Positive for sensory change and focal weakness. Negative for dizziness and headaches. Endo/Heme/Allergies: Does not bruise/bleed easily. Psychiatric/Behavioral: Negative for substance abuse. Visit Vitals  /78 (BP 1 Location: Left upper arm, BP Patient Position: Sitting, BP Cuff Size: Adult)   Pulse 97   Ht 5' 6\" (1.676 m)   Wt 94.3 kg (208 lb)   SpO2 98%   BMI 33.57 kg/m²       Physical Exam  Constitutional:       Appearance: He is well-developed. HENT:      Head: Normocephalic and atraumatic. Eyes:      Conjunctiva/sclera: Conjunctivae normal.   Neck:      Vascular: No carotid bruit or JVD. Cardiovascular:      Rate and Rhythm: Normal rate. Rhythm irregularly irregular. Pulses: Normal pulses. Heart sounds: S1 normal and S2 normal. No murmur heard. No gallop. No S3 sounds. Pulmonary:      Breath sounds: Normal breath sounds. No wheezing or rales. Abdominal:      General: Bowel sounds are normal.      Palpations: Abdomen is soft. Tenderness: There is no abdominal tenderness. Musculoskeletal:         General: No swelling or deformity. Cervical back: Neck supple. Skin:     General: Skin is warm and dry. Neurological:      Mental Status: He is alert and oriented to person, place, and time. Psychiatric:         Mood and Affect: Mood normal.       October 13, 2021 EKG: Atrial fibrillation, controlled ventricular rate in the 60s, nonspecific T wave abnormality. No ST segment changes concerning for ischemia. No change compared to the previous EKGs. ASSESSMENT and PLAN  Encounter Diagnoses   Name Primary?  Longstanding persistent atrial fibrillation (HCC) Yes    Essential hypertension with goal blood pressure less than 140/90     Dyslipidemia     Spinal stenosis in cervical region     PARISH (obstructive sleep apnea)      Low risk from a cardiac standpoint to proceed with cervical line surgery next month as scheduled.   Patient can stop his Eliquis for 2 to 3 days prior to the procedure. I would like him to resume the medication several days postoperatively when safe from a surgical standpoint. He should continue his current dose of Bystolic and digoxin perioperatively. Longstanding persistent atrial fibrillation. Patient is now asymptomatic to his arrhythmia. He has been treated with a beta-blocker and digoxin for rate control and Eliquis for oral anticoagulation. If he has not had a digoxin level within the past 12 months, I have recommended checking one at his convenience. Essential hypertension. Patient's blood pressure appears reasonable control on his current medical regimen which she has been on for quite some time. I would continue these medications. Mixed dyslipidemia. Patient's been treated with a combination of simvastatin and fenofibrate. This is followed closely by his PCP. Obstructive sleep apnea. Patient uses his CPAP most nights. Follow-up annually, sooner if needed.

## 2021-10-15 NOTE — LETTER
10/15/2021 9:50 AM    Mr. [address] [facility] [id number]                    Dr. Debora Horn was seen in our office on 10/15/2021 for cardiac evaluation. From a cardiac standpoint he is cleared for surgery at a low cardiac risk. He may hold his Eliquis for 2-3 days prior to surgery. Please feel free to contact our office if you have any questions regarding this patient.            Sincerely,        Ric Soto MD

## 2021-10-20 ENCOUNTER — HOSPITAL ENCOUNTER (OUTPATIENT)
Dept: PREADMISSION TESTING | Age: 61
Discharge: HOME | End: 2021-10-20

## 2021-10-20 VITALS — WEIGHT: 208 LBS | HEIGHT: 66 IN | BODY MASS INDEX: 33.43 KG/M2

## 2021-10-20 RX ORDER — SODIUM CHLORIDE, SODIUM LACTATE, POTASSIUM CHLORIDE, CALCIUM CHLORIDE 600; 310; 30; 20 MG/100ML; MG/100ML; MG/100ML; MG/100ML
125 INJECTION, SOLUTION INTRAVENOUS CONTINUOUS
Status: CANCELLED | OUTPATIENT
Start: 2021-10-20

## 2021-10-20 RX ORDER — CEFAZOLIN SODIUM/WATER 2 G/20 ML
2 SYRINGE (ML) INTRAVENOUS ONCE
Status: CANCELLED | OUTPATIENT
Start: 2021-10-20 | End: 2021-10-20

## 2021-10-20 NOTE — PERIOP NOTES
PAT phone interview completed. Patient made aware to be NPO and to quarantine. Patient stated he will come 10/29 for COVID test. Patient made aware not to wear jewelry, lotion, oil or spray on DOS. Patient stated he has a ride home for discharge. Patient stated he will stop Eliquis 2-3 days prior to DOS. Patient stated he does not have a DNR order.

## 2021-10-21 NOTE — PERIOP NOTES
Faxed labs as requested by anesthesia and also left a message for Renato Andujar at Dr. Guzman Kitchen office to alert Dr. Jairon Marcus.

## 2021-10-22 ENCOUNTER — PATIENT OUTREACH (OUTPATIENT)
Dept: CASE MANAGEMENT | Age: 61
End: 2021-10-22

## 2021-10-22 NOTE — PROGRESS NOTES
ACMs final outreach-  Patient/family has the ability to self-manage at this time. Patient has Nurse's contact information for any further questions, concerns, or needs.   Patients upcoming visits:    Future Appointments   Date Time Provider Sadaf Davis   10/17/2022  9:00 AM Leandra Monaco MD [facility] AMB

## 2021-10-29 ENCOUNTER — HOSPITAL ENCOUNTER (OUTPATIENT)
Dept: PREADMISSION TESTING | Age: 61
Discharge: HOME | End: 2021-10-29
Payer: COMMERCIAL

## 2021-10-29 PROCEDURE — U0003 INFECTIOUS AGENT DETECTION BY NUCLEIC ACID (DNA OR RNA); SEVERE ACUTE RESPIRATORY SYNDROME CORONAVIRUS 2 (SARS-COV-2) (CORONAVIRUS DISEASE [COVID-19]), AMPLIFIED PROBE TECHNIQUE, MAKING USE OF HIGH THROUGHPUT TECHNOLOGIES AS DESCRIBED BY CMS-2020-01-R: HCPCS

## 2021-10-30 LAB — SARS-COV-2, COV2NT: NOT DETECTED

## 2021-11-02 NOTE — H&P
Pre-Admission History and Physical    Patient: Marilee Phillips   MRN: [id number]   SSN: [id number]   YOB: 1960   Age: 59 y.o. Sex: male     Patient scheduled for: anterior cervical decompression fusion cervical 4/5, 5/6. Date of surgery: 11/3/21. Surgeon: Manuel Schulz MD    HPI:  Marilee Phillips is a 59 y.o. male with left arm weakness. He reports a pain level of 6/10. MRI demonstrates spondylitic stenosis primarily at C4/5 and C5/6 with degenerative changes above and below. This patient has failed the presurgical conservative treatments  including physical therapy, spinal block injections and medications. Pain has impacted the patient's functional ability. He is being admitted for surgical intervention. Past Medical History:   Diagnosis Date    Arthropathy     C6-7 disc bulge    Cardiac echocardiogram 10/2021    EF 55-60%, mild concentric LVH, no valvular heart disease, negative bubble study    Cardiac Holter monitor, abnormal 06/27/2016    Rhythm is atrial fibrillation. Avg HR 68 bpm (range  bpm). Normal QRS.  Dyslipidemia     HTN (hypertension)     Neck pain     PARISH (obstructive sleep apnea)     PAF (paroxysmal atrial fibrillation) (HCC)     Right arm pain     and weakness    Spinal stenosis      Social History     Socioeconomic History    Marital status:      Spouse name: Not on file    Number of children: Not on file    Years of education: Not on file    Highest education level: Not on file   Tobacco Use    Smoking status: Never Smoker    Smokeless tobacco: Current User   Substance and Sexual Activity    Alcohol use: Yes     Social Determinants of Health     Financial Resource Strain:     Difficulty of Paying Living Expenses:    Food Insecurity:     Worried About Running Out of Food in the Last Year:     Ran Out of Food in the Last Year:    Transportation Needs:     Lack of Transportation (Medical):      Lack of Transportation (Non-Medical): Physical Activity:     Days of Exercise per Week:     Minutes of Exercise per Session:    Stress:     Feeling of Stress :    Social Connections:     Frequency of Communication with Friends and Family:     Frequency of Social Gatherings with Friends and Family:     Attends Jainism Services:     Active Member of Clubs or Organizations:     Attends Club or Organization Meetings:     Marital Status:      Past Surgical History:   Procedure Laterality Date    HX KNEE ARTHROSCOPY Left      Family History   Problem Relation Age of Onset    Cancer Mother         bone    Cancer Father         lung    Cancer Brother         leukemia     Allergies   Allergen Reactions    Norvasc [Amlodipine] Swelling     Severe LE edema    Losartan Swelling     Current Outpatient Medications   Medication Sig Dispense Refill    Eliquis 5 mg tablet Take 5 mg by mouth two (2) times a day.  fenofibrate (LOFIBRA) 160 mg tablet take 1 tablet by mouth once daily WITH A MEAL      oxyCODONE IR (ROXICODONE) 5 mg immediate release tablet take 1 tablet by mouth every 6 hours if needed for pain      testosterone cypionate (DEPOTESTOTERONE CYPIONATE) 200 mg/mL injection every month.  0    hydrALAZINE (APRESOLINE) 25 mg tablet two (2) times a day.  0    digoxin (LANOXIN) 0.25 mg tablet Take 1 Tab by mouth daily. 30 Tab 1    nebivolol (BYSTOLIC) 10 mg tablet Take 1 Tab by mouth two (2) times a day. (Patient taking differently: Take 10 mg by mouth daily.) 60 Tab 1    spironolactone (ALDACTONE) 25 mg tablet Take 1 Tab by mouth daily. 30 Tab 1    valsartan (DIOVAN) 160 mg tablet Take 1 Tab by mouth daily. 30 Tab 1    simvastatin (ZOCOR) 10 mg tablet Take  by mouth nightly.  sertraline (ZOLOFT) 25 mg tablet Take  by mouth daily. ROS:  Denies chills, fever,night sweats,  bowel or bladder dysfunction, unexplained weight loss/weight gain, chest pain, sob or anxiety.     Physical Examination    Gen: Well developed, well nourished 59 y.o. male with diffuse weakness of his left upper extremity, most prominent in his deltoids biceps and triceps. No clonus, no hoffmas, no babinski. No clear reflex changes. Some numbness in his left arm . Normal tandem gait. Assessment and Plan    Due to the pt's persistent symptoms unrelieved by conservative measure Charisma Garrido is being admitted to undergo surgical intervention. The post-operative plan of care consists of physical therapy, home health and a 2 week f/u office visit. The risks, benefits, complications and alternatives to surgery have been discussed in detail with the patient. The patient understands and agrees to proceed.

## 2021-11-03 ENCOUNTER — ANESTHESIA (OUTPATIENT)
Dept: SURGERY | Age: 61
End: 2021-11-03
Payer: COMMERCIAL

## 2021-11-03 ENCOUNTER — APPOINTMENT (OUTPATIENT)
Dept: GENERAL RADIOLOGY | Age: 61
End: 2021-11-03
Attending: ORTHOPAEDIC SURGERY
Payer: COMMERCIAL

## 2021-11-03 ENCOUNTER — ANESTHESIA EVENT (OUTPATIENT)
Dept: SURGERY | Age: 61
End: 2021-11-03
Payer: COMMERCIAL

## 2021-11-03 ENCOUNTER — HOSPITAL ENCOUNTER (OUTPATIENT)
Age: 61
Discharge: HOME | End: 2021-11-04
Attending: ORTHOPAEDIC SURGERY | Admitting: ORTHOPAEDIC SURGERY
Payer: COMMERCIAL

## 2021-11-03 DIAGNOSIS — Z98.1 S/P CERVICAL SPINAL FUSION: Primary | ICD-10-CM

## 2021-11-03 PROBLEM — M48.02 CERVICAL STENOSIS OF SPINAL CANAL: Status: ACTIVE | Noted: 2021-11-03

## 2021-11-03 LAB
ABO + RH BLD: NORMAL
BLOOD GROUP ANTIBODIES SERPL: NORMAL
DIGOXIN SERPL-MCNC: 0.1 NG/ML (ref 0.9–2)
SPECIMEN EXP DATE BLD: NORMAL

## 2021-11-03 PROCEDURE — 74011000250 HC RX REV CODE- 250: Performed by: NURSE ANESTHETIST, CERTIFIED REGISTERED

## 2021-11-03 PROCEDURE — 77030012407 HC DRN WND BARD -B: Performed by: ORTHOPAEDIC SURGERY

## 2021-11-03 PROCEDURE — 77030002933 HC SUT MCRYL J&J -A: Performed by: ORTHOPAEDIC SURGERY

## 2021-11-03 PROCEDURE — 97161 PT EVAL LOW COMPLEX 20 MIN: CPT

## 2021-11-03 PROCEDURE — C1713 ANCHOR/SCREW BN/BN,TIS/BN: HCPCS | Performed by: ORTHOPAEDIC SURGERY

## 2021-11-03 PROCEDURE — 86901 BLOOD TYPING SEROLOGIC RH(D): CPT

## 2021-11-03 PROCEDURE — 77030014007 HC SPNG HEMSTAT J&J -B: Performed by: ORTHOPAEDIC SURGERY

## 2021-11-03 PROCEDURE — 77030040506 HC DRN WND MDII -A: Performed by: ORTHOPAEDIC SURGERY

## 2021-11-03 PROCEDURE — 77030010512 HC APPL CLP LIG J&J -C: Performed by: ORTHOPAEDIC SURGERY

## 2021-11-03 PROCEDURE — 74011250636 HC RX REV CODE- 250/636: Performed by: ANESTHESIOLOGY

## 2021-11-03 PROCEDURE — 77030031140 HC SUT VCRL3 J&J -A: Performed by: ORTHOPAEDIC SURGERY

## 2021-11-03 PROCEDURE — 74011000250 HC RX REV CODE- 250: Performed by: ORTHOPAEDIC SURGERY

## 2021-11-03 PROCEDURE — 80162 ASSAY OF DIGOXIN TOTAL: CPT

## 2021-11-03 PROCEDURE — 77030034475 HC MISC IMPL SPN: Performed by: ORTHOPAEDIC SURGERY

## 2021-11-03 PROCEDURE — 2709999900 HC NON-CHARGEABLE SUPPLY: Performed by: ORTHOPAEDIC SURGERY

## 2021-11-03 PROCEDURE — 76010000149 HC OR TIME 1 TO 1.5 HR: Performed by: ORTHOPAEDIC SURGERY

## 2021-11-03 PROCEDURE — 76210000006 HC OR PH I REC 0.5 TO 1 HR: Performed by: ORTHOPAEDIC SURGERY

## 2021-11-03 PROCEDURE — 77030020268 HC MISC GENERAL SUPPLY: Performed by: ORTHOPAEDIC SURGERY

## 2021-11-03 PROCEDURE — 77030011267 HC ELECTRD BLD COVD -A: Performed by: ORTHOPAEDIC SURGERY

## 2021-11-03 PROCEDURE — 97535 SELF CARE MNGMENT TRAINING: CPT

## 2021-11-03 PROCEDURE — 74011250636 HC RX REV CODE- 250/636: Performed by: NURSE ANESTHETIST, CERTIFIED REGISTERED

## 2021-11-03 PROCEDURE — 77030008477 HC STYL SATN SLP COVD -A: Performed by: ANESTHESIOLOGY

## 2021-11-03 PROCEDURE — 77030034479 HC ADH SKN CLSR PRINEO J&J -B: Performed by: ORTHOPAEDIC SURGERY

## 2021-11-03 PROCEDURE — 77030029099 HC BN WAX SSPC -A: Performed by: ORTHOPAEDIC SURGERY

## 2021-11-03 PROCEDURE — 77030004402 HC BUR NEUR STRY -C: Performed by: ORTHOPAEDIC SURGERY

## 2021-11-03 PROCEDURE — 36415 COLL VENOUS BLD VENIPUNCTURE: CPT

## 2021-11-03 PROCEDURE — 97165 OT EVAL LOW COMPLEX 30 MIN: CPT

## 2021-11-03 PROCEDURE — 74011250636 HC RX REV CODE- 250/636: Performed by: ORTHOPAEDIC SURGERY

## 2021-11-03 PROCEDURE — 77030008683 HC TU ET CUF COVD -A: Performed by: ANESTHESIOLOGY

## 2021-11-03 PROCEDURE — L0120 CERV FLEX N/ADJ FOAM PRE OTS: HCPCS | Performed by: ORTHOPAEDIC SURGERY

## 2021-11-03 PROCEDURE — 74011250637 HC RX REV CODE- 250/637: Performed by: ORTHOPAEDIC SURGERY

## 2021-11-03 PROCEDURE — 77030020782 HC GWN BAIR PAWS FLX 3M -B: Performed by: ORTHOPAEDIC SURGERY

## 2021-11-03 PROCEDURE — 77030040361 HC SLV COMPR DVT MDII -B: Performed by: ORTHOPAEDIC SURGERY

## 2021-11-03 PROCEDURE — 77030006643: Performed by: ANESTHESIOLOGY

## 2021-11-03 PROCEDURE — 97116 GAIT TRAINING THERAPY: CPT

## 2021-11-03 PROCEDURE — 76060000033 HC ANESTHESIA 1 TO 1.5 HR: Performed by: ORTHOPAEDIC SURGERY

## 2021-11-03 PROCEDURE — 77030014008 HC SPNG HEMSTAT J&J -C: Performed by: ORTHOPAEDIC SURGERY

## 2021-11-03 DEVICE — SCREW SPNL L14MM DIA4MM SELF STARTING VAR ANT CERV TI OZARK: Type: IMPLANTABLE DEVICE | Site: SPINE CERVICAL | Status: FUNCTIONAL

## 2021-11-03 RX ORDER — DEXTROSE, SODIUM CHLORIDE, AND POTASSIUM CHLORIDE 5; .45; .15 G/100ML; G/100ML; G/100ML
25 INJECTION INTRAVENOUS CONTINUOUS
Status: DISCONTINUED | OUTPATIENT
Start: 2021-11-03 | End: 2021-11-04 | Stop reason: HOSPADM

## 2021-11-03 RX ORDER — HYDROCODONE BITARTRATE AND ACETAMINOPHEN 5; 325 MG/1; MG/1
1 TABLET ORAL AS NEEDED
Status: DISCONTINUED | OUTPATIENT
Start: 2021-11-03 | End: 2021-11-03 | Stop reason: HOSPADM

## 2021-11-03 RX ORDER — LIDOCAINE HYDROCHLORIDE 20 MG/ML
INJECTION, SOLUTION EPIDURAL; INFILTRATION; INTRACAUDAL; PERINEURAL AS NEEDED
Status: DISCONTINUED | OUTPATIENT
Start: 2021-11-03 | End: 2021-11-03 | Stop reason: HOSPADM

## 2021-11-03 RX ORDER — ONDANSETRON 2 MG/ML
4 INJECTION INTRAMUSCULAR; INTRAVENOUS
Status: DISCONTINUED | OUTPATIENT
Start: 2021-11-03 | End: 2021-11-04 | Stop reason: HOSPADM

## 2021-11-03 RX ORDER — MIDAZOLAM HYDROCHLORIDE 1 MG/ML
INJECTION, SOLUTION INTRAMUSCULAR; INTRAVENOUS AS NEEDED
Status: DISCONTINUED | OUTPATIENT
Start: 2021-11-03 | End: 2021-11-03 | Stop reason: HOSPADM

## 2021-11-03 RX ORDER — INSULIN LISPRO 100 [IU]/ML
INJECTION, SOLUTION INTRAVENOUS; SUBCUTANEOUS ONCE
Status: DISCONTINUED | OUTPATIENT
Start: 2021-11-03 | End: 2021-11-03 | Stop reason: HOSPADM

## 2021-11-03 RX ORDER — KETAMINE HCL IN 0.9 % NACL 50 MG/5 ML
SYRINGE (ML) INTRAVENOUS AS NEEDED
Status: DISCONTINUED | OUTPATIENT
Start: 2021-11-03 | End: 2021-11-03 | Stop reason: HOSPADM

## 2021-11-03 RX ORDER — METOPROLOL SUCCINATE 100 MG/1
100 TABLET, EXTENDED RELEASE ORAL DAILY
Status: DISCONTINUED | OUTPATIENT
Start: 2021-11-04 | End: 2021-11-04 | Stop reason: HOSPADM

## 2021-11-03 RX ORDER — NALOXONE HYDROCHLORIDE 0.4 MG/ML
0.4 INJECTION, SOLUTION INTRAMUSCULAR; INTRAVENOUS; SUBCUTANEOUS AS NEEDED
Status: DISCONTINUED | OUTPATIENT
Start: 2021-11-03 | End: 2021-11-04 | Stop reason: HOSPADM

## 2021-11-03 RX ORDER — PROPOFOL 10 MG/ML
INJECTION, EMULSION INTRAVENOUS AS NEEDED
Status: DISCONTINUED | OUTPATIENT
Start: 2021-11-03 | End: 2021-11-03 | Stop reason: HOSPADM

## 2021-11-03 RX ORDER — DIGOXIN 250 MCG
0.25 TABLET ORAL DAILY
Status: DISCONTINUED | OUTPATIENT
Start: 2021-11-04 | End: 2021-11-04 | Stop reason: HOSPADM

## 2021-11-03 RX ORDER — HYDROMORPHONE HYDROCHLORIDE 2 MG/ML
0.2 INJECTION, SOLUTION INTRAMUSCULAR; INTRAVENOUS; SUBCUTANEOUS AS NEEDED
Status: DISCONTINUED | OUTPATIENT
Start: 2021-11-03 | End: 2021-11-03 | Stop reason: HOSPADM

## 2021-11-03 RX ORDER — DEXAMETHASONE SODIUM PHOSPHATE 4 MG/ML
INJECTION, SOLUTION INTRA-ARTICULAR; INTRALESIONAL; INTRAMUSCULAR; INTRAVENOUS; SOFT TISSUE AS NEEDED
Status: DISCONTINUED | OUTPATIENT
Start: 2021-11-03 | End: 2021-11-03 | Stop reason: HOSPADM

## 2021-11-03 RX ORDER — CEFAZOLIN SODIUM/WATER 2 G/20 ML
2 SYRINGE (ML) INTRAVENOUS ONCE
Status: COMPLETED | OUTPATIENT
Start: 2021-11-03 | End: 2021-11-03

## 2021-11-03 RX ORDER — PHENYLEPHRINE HCL IN 0.9% NACL 1 MG/10 ML
SYRINGE (ML) INTRAVENOUS AS NEEDED
Status: DISCONTINUED | OUTPATIENT
Start: 2021-11-03 | End: 2021-11-03 | Stop reason: HOSPADM

## 2021-11-03 RX ORDER — SERTRALINE HYDROCHLORIDE 50 MG/1
25 TABLET, FILM COATED ORAL DAILY
Status: DISCONTINUED | OUTPATIENT
Start: 2021-11-04 | End: 2021-11-04 | Stop reason: HOSPADM

## 2021-11-03 RX ORDER — CEFAZOLIN SODIUM/WATER 2 G/20 ML
2 SYRINGE (ML) INTRAVENOUS EVERY 8 HOURS
Status: DISCONTINUED | OUTPATIENT
Start: 2021-11-03 | End: 2021-11-04 | Stop reason: HOSPADM

## 2021-11-03 RX ORDER — VANCOMYCIN HYDROCHLORIDE 1 G/20ML
INJECTION, POWDER, LYOPHILIZED, FOR SOLUTION INTRAVENOUS AS NEEDED
Status: DISCONTINUED | OUTPATIENT
Start: 2021-11-03 | End: 2021-11-03 | Stop reason: HOSPADM

## 2021-11-03 RX ORDER — FENTANYL CITRATE 50 UG/ML
25 INJECTION, SOLUTION INTRAMUSCULAR; INTRAVENOUS
Status: DISCONTINUED | OUTPATIENT
Start: 2021-11-03 | End: 2021-11-03 | Stop reason: HOSPADM

## 2021-11-03 RX ORDER — MAGNESIUM SULFATE 100 %
4 CRYSTALS MISCELLANEOUS AS NEEDED
Status: DISCONTINUED | OUTPATIENT
Start: 2021-11-03 | End: 2021-11-03 | Stop reason: HOSPADM

## 2021-11-03 RX ORDER — SODIUM CHLORIDE 0.9 % (FLUSH) 0.9 %
5-40 SYRINGE (ML) INJECTION EVERY 8 HOURS
Status: DISCONTINUED | OUTPATIENT
Start: 2021-11-03 | End: 2021-11-04 | Stop reason: HOSPADM

## 2021-11-03 RX ORDER — ACETAMINOPHEN 500 MG
1000 TABLET ORAL EVERY 6 HOURS
Status: DISCONTINUED | OUTPATIENT
Start: 2021-11-03 | End: 2021-11-04 | Stop reason: HOSPADM

## 2021-11-03 RX ORDER — EPHEDRINE SULFATE/0.9% NACL/PF 50 MG/5 ML
SYRINGE (ML) INTRAVENOUS AS NEEDED
Status: DISCONTINUED | OUTPATIENT
Start: 2021-11-03 | End: 2021-11-03 | Stop reason: HOSPADM

## 2021-11-03 RX ORDER — SODIUM CHLORIDE, SODIUM LACTATE, POTASSIUM CHLORIDE, CALCIUM CHLORIDE 600; 310; 30; 20 MG/100ML; MG/100ML; MG/100ML; MG/100ML
150 INJECTION, SOLUTION INTRAVENOUS CONTINUOUS
Status: DISCONTINUED | OUTPATIENT
Start: 2021-11-03 | End: 2021-11-03 | Stop reason: HOSPADM

## 2021-11-03 RX ORDER — SODIUM CHLORIDE 0.9 % (FLUSH) 0.9 %
5-40 SYRINGE (ML) INJECTION AS NEEDED
Status: DISCONTINUED | OUTPATIENT
Start: 2021-11-03 | End: 2021-11-03 | Stop reason: HOSPADM

## 2021-11-03 RX ORDER — ALBUTEROL SULFATE 0.83 MG/ML
2.5 SOLUTION RESPIRATORY (INHALATION) AS NEEDED
Status: DISCONTINUED | OUTPATIENT
Start: 2021-11-03 | End: 2021-11-03 | Stop reason: HOSPADM

## 2021-11-03 RX ORDER — OXYCODONE HYDROCHLORIDE 5 MG/1
5-10 TABLET ORAL
Status: DISCONTINUED | OUTPATIENT
Start: 2021-11-03 | End: 2021-11-04 | Stop reason: HOSPADM

## 2021-11-03 RX ORDER — ONDANSETRON 2 MG/ML
4 INJECTION INTRAMUSCULAR; INTRAVENOUS ONCE
Status: DISCONTINUED | OUTPATIENT
Start: 2021-11-03 | End: 2021-11-03 | Stop reason: HOSPADM

## 2021-11-03 RX ORDER — VALSARTAN 160 MG/1
160 TABLET ORAL DAILY
Status: DISCONTINUED | OUTPATIENT
Start: 2021-11-04 | End: 2021-11-04 | Stop reason: HOSPADM

## 2021-11-03 RX ORDER — SPIRONOLACTONE 25 MG/1
25 TABLET ORAL DAILY
Status: DISCONTINUED | OUTPATIENT
Start: 2021-11-03 | End: 2021-11-04 | Stop reason: HOSPADM

## 2021-11-03 RX ORDER — DIPHENHYDRAMINE HCL 25 MG
25 CAPSULE ORAL
Status: DISCONTINUED | OUTPATIENT
Start: 2021-11-03 | End: 2021-11-04 | Stop reason: HOSPADM

## 2021-11-03 RX ORDER — ONDANSETRON 2 MG/ML
INJECTION INTRAMUSCULAR; INTRAVENOUS AS NEEDED
Status: DISCONTINUED | OUTPATIENT
Start: 2021-11-03 | End: 2021-11-03 | Stop reason: HOSPADM

## 2021-11-03 RX ORDER — ROCURONIUM BROMIDE 10 MG/ML
INJECTION, SOLUTION INTRAVENOUS AS NEEDED
Status: DISCONTINUED | OUTPATIENT
Start: 2021-11-03 | End: 2021-11-03 | Stop reason: HOSPADM

## 2021-11-03 RX ORDER — HYDROMORPHONE HYDROCHLORIDE 1 MG/ML
INJECTION, SOLUTION INTRAMUSCULAR; INTRAVENOUS; SUBCUTANEOUS AS NEEDED
Status: DISCONTINUED | OUTPATIENT
Start: 2021-11-03 | End: 2021-11-03 | Stop reason: HOSPADM

## 2021-11-03 RX ORDER — HYDROMORPHONE HYDROCHLORIDE 1 MG/ML
1 INJECTION, SOLUTION INTRAMUSCULAR; INTRAVENOUS; SUBCUTANEOUS
Status: DISCONTINUED | OUTPATIENT
Start: 2021-11-03 | End: 2021-11-04 | Stop reason: HOSPADM

## 2021-11-03 RX ORDER — DEXTROSE 50 % IN WATER (D50W) INTRAVENOUS SYRINGE
25-50 AS NEEDED
Status: DISCONTINUED | OUTPATIENT
Start: 2021-11-03 | End: 2021-11-03 | Stop reason: HOSPADM

## 2021-11-03 RX ORDER — FAMOTIDINE 20 MG/1
40 TABLET, FILM COATED ORAL EVERY 12 HOURS
Status: DISCONTINUED | OUTPATIENT
Start: 2021-11-03 | End: 2021-11-04 | Stop reason: HOSPADM

## 2021-11-03 RX ORDER — BUPIVACAINE HYDROCHLORIDE 5 MG/ML
INJECTION, SOLUTION EPIDURAL; INTRACAUDAL AS NEEDED
Status: DISCONTINUED | OUTPATIENT
Start: 2021-11-03 | End: 2021-11-03 | Stop reason: HOSPADM

## 2021-11-03 RX ORDER — NALOXONE HYDROCHLORIDE 0.4 MG/ML
0.1 INJECTION, SOLUTION INTRAMUSCULAR; INTRAVENOUS; SUBCUTANEOUS AS NEEDED
Status: DISCONTINUED | OUTPATIENT
Start: 2021-11-03 | End: 2021-11-03 | Stop reason: HOSPADM

## 2021-11-03 RX ORDER — SODIUM CHLORIDE, SODIUM LACTATE, POTASSIUM CHLORIDE, CALCIUM CHLORIDE 600; 310; 30; 20 MG/100ML; MG/100ML; MG/100ML; MG/100ML
125 INJECTION, SOLUTION INTRAVENOUS CONTINUOUS
Status: DISCONTINUED | OUTPATIENT
Start: 2021-11-03 | End: 2021-11-04 | Stop reason: HOSPADM

## 2021-11-03 RX ORDER — SODIUM CHLORIDE 0.9 % (FLUSH) 0.9 %
5-40 SYRINGE (ML) INJECTION EVERY 8 HOURS
Status: DISCONTINUED | OUTPATIENT
Start: 2021-11-03 | End: 2021-11-03 | Stop reason: HOSPADM

## 2021-11-03 RX ORDER — LORAZEPAM 2 MG/ML
1 INJECTION INTRAMUSCULAR
Status: DISCONTINUED | OUTPATIENT
Start: 2021-11-03 | End: 2021-11-04 | Stop reason: HOSPADM

## 2021-11-03 RX ORDER — DIAZEPAM 5 MG/1
5 TABLET ORAL
Status: DISCONTINUED | OUTPATIENT
Start: 2021-11-03 | End: 2021-11-04 | Stop reason: HOSPADM

## 2021-11-03 RX ORDER — SODIUM CHLORIDE 0.9 % (FLUSH) 0.9 %
5-40 SYRINGE (ML) INJECTION AS NEEDED
Status: DISCONTINUED | OUTPATIENT
Start: 2021-11-03 | End: 2021-11-04 | Stop reason: HOSPADM

## 2021-11-03 RX ORDER — DIPHENHYDRAMINE HYDROCHLORIDE 50 MG/ML
12.5 INJECTION, SOLUTION INTRAMUSCULAR; INTRAVENOUS
Status: DISCONTINUED | OUTPATIENT
Start: 2021-11-03 | End: 2021-11-04 | Stop reason: HOSPADM

## 2021-11-03 RX ORDER — DOCUSATE SODIUM 100 MG/1
100 CAPSULE, LIQUID FILLED ORAL 2 TIMES DAILY
Status: DISCONTINUED | OUTPATIENT
Start: 2021-11-03 | End: 2021-11-04 | Stop reason: HOSPADM

## 2021-11-03 RX ORDER — HYDRALAZINE HYDROCHLORIDE 50 MG/1
25 TABLET, FILM COATED ORAL 3 TIMES DAILY
Status: DISCONTINUED | OUTPATIENT
Start: 2021-11-03 | End: 2021-11-04 | Stop reason: HOSPADM

## 2021-11-03 RX ORDER — DIPHENHYDRAMINE HYDROCHLORIDE 50 MG/ML
12.5 INJECTION, SOLUTION INTRAMUSCULAR; INTRAVENOUS
Status: DISCONTINUED | OUTPATIENT
Start: 2021-11-03 | End: 2021-11-03 | Stop reason: HOSPADM

## 2021-11-03 RX ADMIN — ACETAMINOPHEN 1000 MG: 500 TABLET ORAL at 18:06

## 2021-11-03 RX ADMIN — FENTANYL CITRATE 25 MCG: 50 INJECTION, SOLUTION INTRAMUSCULAR; INTRAVENOUS at 12:50

## 2021-11-03 RX ADMIN — Medication 100 MCG: at 11:31

## 2021-11-03 RX ADMIN — Medication 100 MCG: at 11:35

## 2021-11-03 RX ADMIN — Medication 12.5 MG: at 10:59

## 2021-11-03 RX ADMIN — Medication 20 MG: at 11:19

## 2021-11-03 RX ADMIN — LIDOCAINE HYDROCHLORIDE 100 MG: 20 INJECTION, SOLUTION INTRAVENOUS at 10:54

## 2021-11-03 RX ADMIN — SUGAMMADEX 160 MG: 100 INJECTION, SOLUTION INTRAVENOUS at 12:06

## 2021-11-03 RX ADMIN — SODIUM CHLORIDE, SODIUM LACTATE, POTASSIUM CHLORIDE, AND CALCIUM CHLORIDE: 600; 310; 30; 20 INJECTION, SOLUTION INTRAVENOUS at 12:01

## 2021-11-03 RX ADMIN — PROPOFOL 180 MG: 10 INJECTION, EMULSION INTRAVENOUS at 10:54

## 2021-11-03 RX ADMIN — FAMOTIDINE 40 MG: 20 TABLET, FILM COATED ORAL at 14:15

## 2021-11-03 RX ADMIN — ROCURONIUM BROMIDE 10 MG: 10 INJECTION, SOLUTION INTRAVENOUS at 11:49

## 2021-11-03 RX ADMIN — HYDROMORPHONE HYDROCHLORIDE 1 MG: 1 INJECTION, SOLUTION INTRAMUSCULAR; INTRAVENOUS; SUBCUTANEOUS at 21:10

## 2021-11-03 RX ADMIN — SPIRONOLACTONE 25 MG: 25 TABLET ORAL at 21:32

## 2021-11-03 RX ADMIN — DOCUSATE SODIUM 100 MG: 100 CAPSULE ORAL at 21:32

## 2021-11-03 RX ADMIN — DIAZEPAM 5 MG: 5 TABLET ORAL at 12:51

## 2021-11-03 RX ADMIN — SODIUM CHLORIDE, SODIUM LACTATE, POTASSIUM CHLORIDE, AND CALCIUM CHLORIDE 125 ML/HR: 600; 310; 30; 20 INJECTION, SOLUTION INTRAVENOUS at 09:25

## 2021-11-03 RX ADMIN — MIDAZOLAM 2 MG: 1 INJECTION INTRAMUSCULAR; INTRAVENOUS at 10:48

## 2021-11-03 RX ADMIN — OXYCODONE 10 MG: 5 TABLET ORAL at 18:06

## 2021-11-03 RX ADMIN — DEXTROSE MONOHYDRATE, SODIUM CHLORIDE, AND POTASSIUM CHLORIDE 25 ML/HR: 50; 4.5; 1.49 INJECTION, SOLUTION INTRAVENOUS at 14:00

## 2021-11-03 RX ADMIN — HYDROMORPHONE HYDROCHLORIDE 1 MG: 1 INJECTION, SOLUTION INTRAMUSCULAR; INTRAVENOUS; SUBCUTANEOUS at 10:48

## 2021-11-03 RX ADMIN — CEFAZOLIN 2 G: 10 INJECTION, POWDER, FOR SOLUTION INTRAVENOUS at 18:06

## 2021-11-03 RX ADMIN — Medication 100 MCG: at 10:59

## 2021-11-03 RX ADMIN — HYDROMORPHONE HYDROCHLORIDE 0.5 MG: 1 INJECTION, SOLUTION INTRAMUSCULAR; INTRAVENOUS; SUBCUTANEOUS at 11:11

## 2021-11-03 RX ADMIN — Medication 10 ML: at 21:33

## 2021-11-03 RX ADMIN — FENTANYL CITRATE 25 MCG: 50 INJECTION, SOLUTION INTRAMUSCULAR; INTRAVENOUS at 12:40

## 2021-11-03 RX ADMIN — Medication 30 MG: at 11:11

## 2021-11-03 RX ADMIN — ACETAMINOPHEN 1000 MG: 500 TABLET ORAL at 14:15

## 2021-11-03 RX ADMIN — OXYCODONE 10 MG: 5 TABLET ORAL at 14:15

## 2021-11-03 RX ADMIN — FAMOTIDINE 40 MG: 20 TABLET, FILM COATED ORAL at 21:32

## 2021-11-03 RX ADMIN — HYDRALAZINE HYDROCHLORIDE 25 MG: 50 TABLET, FILM COATED ORAL at 18:05

## 2021-11-03 RX ADMIN — LORAZEPAM 1 MG: 2 INJECTION INTRAMUSCULAR; INTRAVENOUS at 23:00

## 2021-11-03 RX ADMIN — CEFAZOLIN 2 G: 1 INJECTION, POWDER, FOR SOLUTION INTRAVENOUS at 11:03

## 2021-11-03 RX ADMIN — ROCURONIUM BROMIDE 50 MG: 10 INJECTION, SOLUTION INTRAVENOUS at 10:54

## 2021-11-03 RX ADMIN — FENTANYL CITRATE 25 MCG: 50 INJECTION, SOLUTION INTRAMUSCULAR; INTRAVENOUS at 13:00

## 2021-11-03 RX ADMIN — OXYCODONE 10 MG: 5 TABLET ORAL at 22:37

## 2021-11-03 RX ADMIN — ONDANSETRON HYDROCHLORIDE 4 MG: 2 INJECTION INTRAMUSCULAR; INTRAVENOUS at 11:07

## 2021-11-03 RX ADMIN — FENTANYL CITRATE 25 MCG: 50 INJECTION, SOLUTION INTRAMUSCULAR; INTRAVENOUS at 12:30

## 2021-11-03 RX ADMIN — DEXAMETHASONE SODIUM PHOSPHATE 8 MG: 4 INJECTION, SOLUTION INTRAMUSCULAR; INTRAVENOUS at 11:07

## 2021-11-03 NOTE — INTERVAL H&P NOTE
Update History & Physical 
 
The Patient's History and Physical of November 2, 2021 was reviewed with the patient and I examined the patient. There was no change. The surgical site was confirmed by the patient and me. Plan:  The risk, benefits, expected outcome, and alternative to the recommended procedure have been discussed with the patient. Patient understands and wants to proceed with the procedure.  
 
Electronically signed by Rainelle Kanner, MD on 11/3/2021 at 10:03 AM

## 2021-11-03 NOTE — ANESTHESIA POSTPROCEDURE EVALUATION
Procedure(s):  ANTERIOR CERVICAL DECOMPRESSION FUSION CERVICAL FOUR/FIVE , FIVE/SIX WITH C-ARM. general    Anesthesia Post Evaluation      Multimodal analgesia: multimodal analgesia used between 6 hours prior to anesthesia start to PACU discharge  Patient location during evaluation: PACU  Patient participation: complete - patient participated  Level of consciousness: awake  Pain management: adequate  Airway patency: patent  Anesthetic complications: no  Cardiovascular status: acceptable  Respiratory status: acceptable  Hydration status: acceptable  Post anesthesia nausea and vomiting:  none  Final Post Anesthesia Temperature Assessment:  Normothermia (36.0-37.5 degrees C)      INITIAL Post-op Vital signs:   Vitals Value Taken Time   /86 11/03/21 1305   Temp 36.7 °C (98 °F) 11/03/21 1222   Pulse 101 11/03/21 1303   Resp 19 11/03/21 1303   SpO2 95 % 11/03/21 1308   Vitals shown include unvalidated device data.

## 2021-11-03 NOTE — PROGRESS NOTES
Problem: Self Care Deficits Care Plan (Adult)  Goal: *Acute Goals and Plan of Care (Insert Text)  Description: Initial Occupational Therapy Goals (11/3/2021) Within 7 day(s):    1. Patient will perform toilet transfer with supervision in preparation for bowel and bladder management. 2. Patient will perform bowel and bladder management with supervision for increased independence with ADLs. 3. Patient will perform UB dressing with supervision for increased independence with ADLs. 4. Patient will perform LB dressing with supervision & A/E PRN for increased independence with ADLs. 5. Patient will utilize energy conservation techniques with 1 verbal cue(s) for increased independence with ADLs. Outcome: Resolved/Met  OCCUPATIONAL THERAPY EVALUATION/DISCHARGE    Patient: Marilee Phillips (41 y.o. male)  Date: 11/3/2021  Primary Diagnosis: Cervical stenosis of spinal canal [M48.02]  Procedure(s) (LRB):  ANTERIOR CERVICAL DECOMPRESSION FUSION CERVICAL FOUR/FIVE , FIVE/SIX WITH C-ARM (N/A) Day of Surgery   Precautions: Fall, Spinal (cervical)  PLOF: pt mod I for ADLs/functional mobility    ASSESSMENT AND RECOMMENDATIONS:  Based on the objective data described below, the patient presents with BUE decreased ROM and strength affecting UE ADLs. Pt found supine in bed, reporting pain 7/10, agreeable to therapy. Pt mod I for bed mobility and to sit up to EOB. Educated pt on proper body mechanics s/p ACDF and correct use of soft collar. Also discussed clothing modifications for increased independence/comfort. Pt was able to don B socks with mod I using c-sitting position. Pt supervision/mod I for STS/bathroom mobility without AD. Pt returned seated EOB, significant other present during session for education on home safety. Provided opportunity for pt to voice questions on ADL performance when home, pt has no further concerns.      Education: Reviewed Neck/Back Precautions, C-Collar management, body mechanics, home safety, adaptive strategies and adaptive dressing techniques including clothing modifications with patient verbalizing understanding at this time. Skilled Occupational Therapy is not indicated at this time in this setting. Patient should continue to improve as pain and ROM/strength improves. Discharge Recommendations: Home Health  Further Equipment Recommendations for Discharge: N/A      SUBJECTIVE:   Patient stated my neck is really sore.     OBJECTIVE DATA SUMMARY:     Past Medical History:   Diagnosis Date    Arthropathy     C6-7 disc bulge    Cardiac echocardiogram 10/2021    EF 55-60%, mild concentric LVH, no valvular heart disease, negative bubble study    Cardiac Holter monitor, abnormal 06/27/2016    Rhythm is atrial fibrillation. Avg HR 68 bpm (range  bpm). Normal QRS. Dyslipidemia     HTN (hypertension)     Neck pain     PARISH (obstructive sleep apnea)     PAF (paroxysmal atrial fibrillation) (HCC)     Right arm pain     and weakness    Spinal stenosis      Past Surgical History:   Procedure Laterality Date    HX KNEE ARTHROSCOPY Left      Barriers to Learning/Limitations: yes;  physical  Compensate with: visual, verbal, tactile, kinesthetic cues/model    Home Situation/Prior Level of Function:   Home Situation  Home Environment: Private residence  # Steps to Enter: 6  Rails to Enter: No  One/Two Story Residence: Split level  # of Interior Steps: 1  Living Alone: No  Support Systems: Spouse/Significant Other  Patient Expects to be Discharged to[de-identified] [employer]  Current DME Used/Available at Home: None  Tub or Shower Type: Tub/Shower combination  []  Right hand dominant   []  Left hand dominant    Cognitive/Behavioral Status:  Neurologic State: Alert  Orientation Level: Oriented X4  Cognition: Follows commands; Appropriate decision making; Appropriate for age attention/concentration;  Appropriate safety awareness  Safety/Judgement: Awareness of environment    Skin: anterior cervical incision w/ dressing/Mepilex & IRAM drain  Edema: compression hose in place & applied ice     Coordination: BUE  Coordination: Within functional limits  Fine Motor Skills-Upper: Left Intact; Right Intact    Gross Motor Skills-Upper: Left Intact; Right Intact    Balance:  Sitting: Intact  Standing: Intact; Without support    Strength: BUE  BUE's not formally assessed, but observed functionally; B  pinch WFL  Strength: Generally decreased, functional    Tone & Sensation:BUE  Tone: Normal  Sensation: Impaired (L hand)    Range of Motion: BUE  AROM: Generally decreased, functional    Functional Mobility and Transfers for ADLs:  Bed Mobility:  Rolling: Modified independent  Supine to Sit: Modified independent  Scooting: Modified independent  Transfers:  Sit to Stand: Supervision    ADL Assessment:  Feeding: Setup; Modified independent  Oral Facial Hygiene/Grooming: Supervision  Bathing: Stand-by assistance  Upper Body Dressing: Modified independent  Lower Body Dressing: Supervision  Toileting: Modified independent    ADL Intervention:  Lower Body Dressing Assistance  Dressing Assistance: Modified independent  Socks: Modified independent  Leg Crossed Method Used: Yes  Position Performed: Seated edge of bed  Cues: Verbal cues provided; Visual cues provided    Cognitive Retraining  Safety/Judgement: Awareness of environment    Pain:  Pain level pre-treatment: 7/10  Pain level post-treatment: 6/10  Pain Intervention(s): Medication administer by Nursing (see MAR); Rest, Ice, Repositioning   Response to intervention: Nurse notified, see doc flow sheet    Activity Tolerance:   Fair. Patient able to stand ~6 minute(s). Patient able to complete ADLs with intermittent rest breaks. Patient limited by pain, strength, ROM. Patient unsteady. Please refer to the flowsheet for vital signs taken during this treatment.   After treatment:   []  Patient left in no apparent distress sitting up in chair  []  Patient sitting on EOB  []  Patient left in no apparent distress in bed  [x]  Call bell left within reach  [x]  Nursing notified  [x]  Caregiver present  [x]  Ice applied  []  SCD's on while back in bed  [] Bed alarm activated    COMMUNICATION/EDUCATION:   Communication/Collaboration:  [x]       Role of Occupational Therapy in the acute care setting. [x]      Home safety education was provided and the patient/caregiver indicated understanding. [x]      Patient/family have participated as able in goal setting and plan of care. [x]      Patient/family agree to work toward stated goals and plan of care. []      Patient understands intent and goals of therapy, but is neutral about his/her participation. []      Patient is unable to participate in plan of care at this time. Thank you for this referral.  Devan Chand, OTR/L  Time Calculation: 23 mins    Eval Complexity: History: MEDIUM Complexity : Expanded review of history including physical, cognitive and psychosocial  history ; Examination: LOW Complexity : 1-3 performance deficits relating to physical, cognitive , or psychosocial skils that result in activity limitations and / or participation restrictions ;    Decision Making:LOW Complexity : No comorbidities that affect functional and no verbal or physical assistance needed to complete eval tasks

## 2021-11-03 NOTE — PERIOP NOTES
Reviewed PTA medication list with patient/caregiver and patient/caregiver denies any additional medications. Patient admits to having a responsible adult care for them at home for at least 24 hours after surgery. Patient encouraged to use gown warming system and informed that using said warming gown to regulate body temperature prior to a procedure has been shown to help reduce the risks of blood clots and infection. Patient's pharmacy of choice verified and documented in PTA medication section. Dual skin assessment & fall risk band verification completed with Kylie FINCH.

## 2021-11-03 NOTE — PROGRESS NOTES
Problem: Mobility Impaired (Adult and Pediatric)  Goal: *Acute Goals and Plan of Care (Insert Text)  Description: No goals necessary as pt is independent w/ functional mobility  Note: [x]  Patient has met MD brian beltran for d/c home   [x]  Recommend HH with 24 hour adult care   []  Benefit from additional acute PT session to address:      PHYSICAL THERAPY EVALUATION/DISCHARGE    Patient: Cookie Cali (1 y.o. male)  Date: 11/3/2021  Primary Diagnosis: Cervical stenosis of spinal canal [M48.02]  Procedure(s) (LRB):  ANTERIOR CERVICAL DECOMPRESSION FUSION CERVICAL FOUR/FIVE , FIVE/SIX WITH C-ARM (N/A) Day of Surgery   Precautions:   Fall, Spinal    PLOF: Independent    ASSESSMENT :  Based on the objective data described below, the patient presents with independent mobility. Pt rating pain on numerical pain scale pre/post and during session 8/10. Pt and SO ed regarding mobility safety, soft cervical collar, ice application/use,  environmental safety and home safe techniques. Pt able to perform supine<>sit w/ Independent and sit<>stand w/ independent. Safety vc throughout session. Pt able to participate in gt training using no AD, ease, independent and good balance. Pt was able to participate in stair training using step to pattern, L rails and S. Answered questions by pt and SO in regards to PT and mobility. Pt left sitting EOB w/ all needs within reach. Pt does not warrant skilled PT at this time and is able to be up in room ad edie safely. Nurse Lovely Cullen aware of session and outcomes. PLAN :  D/C acute PT at this time as pt is independent and w/o PT needs    Discharge Recommendations: None  Further Equipment Recommendations for Discharge: N/A     SUBJECTIVE:   Patient stated My pain is high.     OBJECTIVE DATA SUMMARY:     Past Medical History:   Diagnosis Date    Arthropathy     C6-7 disc bulge    Cardiac echocardiogram 10/2021    EF 55-60%, mild concentric LVH, no valvular heart disease, negative bubble study    Cardiac Holter monitor, abnormal 06/27/2016    Rhythm is atrial fibrillation. Avg HR 68 bpm (range  bpm). Normal QRS. Dyslipidemia     HTN (hypertension)     Neck pain     PARISH (obstructive sleep apnea)     PAF (paroxysmal atrial fibrillation) (HCC)     Right arm pain     and weakness    Spinal stenosis      Past Surgical History:   Procedure Laterality Date    HX KNEE ARTHROSCOPY Left      Barriers to Learning/Limitations: yes;  anesthesia  Compensate with: Visual Cues, Verbal Cues, and Tactile Cues  Home Situation:  Home Situation  Home Environment: Private residence  # Steps to Enter: 6  Rails to Enter: No  One/Two Story Residence: Split level  # of Interior Steps: 1  Living Alone: No  Support Systems: Spouse/Significant Other  Patient Expects to be Discharged to[de-identified] [employer]  Current DME Used/Available at Home: None  Tub or Shower Type: Tub/Shower combination  Critical Behavior:  Neurologic State: Alert  Orientation Level: Oriented X4  Cognition: Appropriate decision making; Appropriate for age attention/concentration; Appropriate safety awareness; Follows commands  Safety/Judgement: Awareness of environment  Psychosocial  Patient Behaviors: Calm;  Cooperative  Family  Behaviors: Supportive; Calm  Skin Condition/Temp: Dry; Warm  Family  Behaviors: Supportive; Calm  Skin Integrity: Incision (comment) (cervical)  Skin Integumentary  Skin Color: Appropriate for ethnicity  Skin Condition/Temp: Dry; Warm  Skin Integrity: Incision (comment) (cervical)     Strength:    Strength: Generally decreased, functional                    Tone & Sensation:   Tone: Normal              Sensation: Impaired (L hand)               Range Of Motion:  AROM: Generally decreased, functional                       Posture:        Functional Mobility:  Bed Mobility:  Rolling: Modified independent  Supine to Sit: Modified independent     Scooting: Modified independent  Transfers:  Sit to Stand: Supervision; Modified independent  Stand to Sit: Supervision; Modified independent                       Balance:   Sitting: Intact  Standing: Intact; Without support  Wheelchair Mobility:        Ambulation/Gait Training:  Distance (ft): 200 Feet (ft)  Assistive Device: Brace/Splint  Ambulation - Level of Assistance: Independent        Gait Abnormalities: Altered arm swing              Speed/[place]il[place]: Pace decreased (<100 feet/min)           Interventions: Safety awareness training; Tactile cues; Verbal cues; Visual/Demos        Stairs:  Number of Stairs Trained: 5  Stairs - Level of Assistance: Supervision  Rail Use: Left      Therapeutic Exercises:     Pain:  Pain level pre-treatment: 8/10   Pain level post-treatment: 8/10   Pain Intervention(s) : Medication (see MAR); Rest, Ice, Repositioning  Response to intervention: Nurse notified, See doc flow    Activity Tolerance:   Good   Please refer to the flowsheet for vital signs taken during this treatment. After treatment:   [x]         Patient left in no apparent distress sitting up in chair  []         Patient left in no apparent distress in bed  [x]         Call bell left within reach  [x]         Nursing notified  [x]         Caregiver present  []         Bed alarm activated  []         SCDs applied    COMMUNICATION/EDUCATION:   [x]         Role of Physical Therapy in the acute care setting. [x]         Fall prevention education was provided and the patient/caregiver indicated understanding. [x]         Patient/family have participated as able in goal setting and plan of care. []         Patient/family agree to work toward stated goals and plan of care. []         Patient understands intent and goals of therapy, but is neutral about his/her participation. []         Patient is unable to participate in goal setting/plan of care: ongoing with therapy staff.  []         Other:     Thank you for this referral.  Milly Garcia, PT   Time Calculation: 23 mins      Eval Complexity: History: HIGH Complexity :3+ comorbidities / personal factors will impact the outcome/ POC Exam:MEDIUM Complexity : 3 Standardized tests and measures addressing body structure, function, activity limitation and / or participation in recreation  Presentation: LOW Complexity : Stable, uncomplicated  Clinical Decision Making:Low Complexity    Overall Complexity:LOW

## 2021-11-03 NOTE — PERIOP NOTES
18 [facility] made aware that SBAR is ready for review. Patient assigned room #213.  Aureliano Scriver will be the nurse

## 2021-11-03 NOTE — ANESTHESIA PREPROCEDURE EVALUATION
Relevant Problems   RESPIRATORY SYSTEM   (+) PARISH (obstructive sleep apnea)      CARDIOVASCULAR   (+) Atrial fibrillation (HCC)   (+) Essential hypertension with goal blood pressure less than 140/90       Anesthetic History   No history of anesthetic complications            Review of Systems / Medical History  Patient summary reviewed, nursing notes reviewed and pertinent labs reviewed    Pulmonary        Sleep apnea: CPAP           Neuro/Psych   Within defined limits           Cardiovascular    Hypertension      CHF  Dysrhythmias : atrial fibrillation      Exercise tolerance: >4 METS     GI/Hepatic/Renal  Within defined limits              Endo/Other  Within defined limits           Other Findings              Physical Exam    Airway  Mallampati: II  TM Distance: 4 - 6 cm  Neck ROM: normal range of motion   Mouth opening: Normal     Cardiovascular  Regular rate and rhythm,  S1 and S2 normal,  no murmur, click, rub, or gallop             Dental  No notable dental hx       Pulmonary  Breath sounds clear to auscultation               Abdominal  GI exam deferred       Other Findings            Anesthetic Plan    ASA: 3  Anesthesia type: general          Induction: Intravenous  Anesthetic plan and risks discussed with: Patient

## 2021-11-03 NOTE — PERIOP NOTES
TRANSFER - OUT REPORT:    Verbal report given to Thai Morton RN(name) on Genice Job  being transferred to [facility](unit) for routine post - op       Report consisted of patients Situation, Background, Assessment and   Recommendations(SBAR). Information from the following report(s) SBAR, Kardex, OR Summary, MAR and Cardiac Rhythm a fib was reviewed with the receiving nurse. Lines:   Peripheral IV 11/03/21 Left Antecubital (Active)   Site Assessment Clean, dry, & intact 11/03/21 1245   Phlebitis Assessment 0 11/03/21 1245   Infiltration Assessment 0 11/03/21 1245   Dressing Status Clean, dry, & intact 11/03/21 1245   Dressing Type Transparent; Tape 11/03/21 1245   Hub Color/Line Status Patent; Infusing; Pink 11/03/21 1245        Opportunity for questions and clarification was provided.       Patient transported with:   Registered Nurse  Tech

## 2021-11-03 NOTE — BRIEF OP NOTE
Brief Postoperative Note    Patient: Charisma Garrido  YOB: 1960  MRN: [id number]    Date of Procedure: 11/3/2021     Pre-Op Diagnosis: CERVICAL STENOSIS    Post-Op Diagnosis: Same as preoperative diagnosis. Procedure(s):  ANTERIOR CERVICAL DECOMPRESSION FUSION CERVICAL FOUR/FIVE , FIVE/SIX WITH C-ARM    Surgeon(s):  Venus Meza MD    Surgical Assistant: Surg Asst-1: Yasmany Sampson    Anesthesia: General     Estimated Blood Loss (mL): Minimal    Complications: None    Specimens: * No specimens in log *     Implants:   Implant Name Type Inv.  Item Serial No.  Lot No. LRB No. Used Action   Assembled Cortico-Cancellous 7mm   [id number] LIFENET 0 N/A 1 Implanted   Assembled Cortico-Cancellous 7mm   [id number] LIFENET 0 N/A 1 Implanted   SCREW SPNL L14MM DIA4MM SELF STARTING CK ANT CERV TI OZARK - [id number]  SCREW SPNL L14MM DIA4MM SELF STARTING CK ANT CERV TI OZARK  CHELLY SPINE HOWM_WD 0 N/A 6 Implanted   40mm [place] Plate    CHELLY SPINE HOWM 0 N/A 1 Implanted       Drains: * No LDAs found *    Findings: stenosis    Electronically Signed by Indigo Dickson MD on 11/3/2021 at 11:58 AM

## 2021-11-03 NOTE — OP NOTES
[facility] [place]  OPERATIVE REPORT    Name:  Praveen Shirley  MR#:   [id number]  :  1960  ACCOUNT #:  [de-identified]  DATE OF SERVICE:  2021    PREOPERATIVE DIAGNOSES:  Cervical spondylosis with radiculopathy and stenosis. POSTOPERATIVE DIAGNOSES:  Cervical spondylosis with radiculopathy and stenosis. PROCEDURES PERFORMED:  Anterior cervical decompression and fusion, C4-C5; anterior cervical decompression and fusion, C5-C6; structural allograft x2; anterior cervical plate fixation, C4, C5, C6, with Louisa anterior cervical locking plate and screws. SURGEON:  Rajendra Garcia MD    ASSISTANT:  0.    ANESTHESIA:  Endotracheal anesthesia. COMPLICATIONS:  None. SPECIMENS REMOVED:  0.    IMPLANTS:  louisa. ESTIMATED BLOOD LOSS:  None. FINDINGS:  The patient had spondylotic stenosis with uncinate spurring and disk protrusions. PROCEDURE:  Following induction of endotracheal anesthesia, the patient was placed with head in neutral position on a [place] headrest.  The patient was prepped and draped in usual fashion. Right-sided approach was utilized. Sternocleidomastoid and great vessels were mobilized laterally. The esophagus and trachea mobilized medially with blunt finger dissection. The prevertebral fascia was entered, and C-arm image verified our surgical level. [place] pins were placed in the bodies of C4 and C5. Cloward self-retaining retractor was placed under the cover of longus colli musculature bilaterally. Annular tissue was incised and radical diskectomy done back to the posterior margin. Posterior marginal osteophytes were thinned with a high-speed latrell, resected with 4.0 Adriana curette and sella punch. Posterior longitudinal ligament was taken and a thorough decompression done of the epidural space, especially the foramina on the left. Endplates were prepared, a #7 structural allograft was tamped firmly into place with excellent bony apposition.   Retractors were resided at C5-C6, and the procedure was repeated. Again, a thorough decompression was done with resection of the uncinate, thorough decompression of the foramina, endplate preparation, structural allograft #7 placement. At this point, an anterior cervical locking plate was selected and affixed to the spine with two screws in C4, two in C5, two in C6 with the locking device engaged. The wound was copiously irrigated. There was no bleeding. Vancomycin powder instilled for infection prophylaxis. A deep drain was placed. The neck was then closed in layers and the skin closed with a subcuticular suture and Dermabond. C-arm image verified positioning of the instrumentations. There were no complications. The patient went to Recovery in good and stable condition.       Alondra Cline MD      [id number]  D:  11/03/2021 12:15  T:  11/03/2021 19:19  JOB #:  [id number]

## 2021-11-03 NOTE — PERIOP NOTES
Dr. Tiffani Robles notified about patient Digoxin level 0.1 and patient last dose of Digoxin 11/2/2021 @ 2300. No further treatment or request at this time.

## 2021-11-04 VITALS
HEIGHT: 66 IN | HEART RATE: 70 BPM | TEMPERATURE: 97.7 F | WEIGHT: 204.7 LBS | SYSTOLIC BLOOD PRESSURE: 132 MMHG | BODY MASS INDEX: 32.9 KG/M2 | RESPIRATION RATE: 18 BRPM | DIASTOLIC BLOOD PRESSURE: 72 MMHG | OXYGEN SATURATION: 98 %

## 2021-11-04 PROCEDURE — 74011250636 HC RX REV CODE- 250/636: Performed by: ORTHOPAEDIC SURGERY

## 2021-11-04 PROCEDURE — 74011250637 HC RX REV CODE- 250/637: Performed by: ORTHOPAEDIC SURGERY

## 2021-11-04 PROCEDURE — 74011000250 HC RX REV CODE- 250: Performed by: ORTHOPAEDIC SURGERY

## 2021-11-04 RX ORDER — OXYCODONE HYDROCHLORIDE 5 MG/1
5 TABLET ORAL
Qty: 28 TABLET | Refills: 0 | Status: SHIPPED | OUTPATIENT
Start: 2021-11-04 | End: 2021-11-11

## 2021-11-04 RX ADMIN — CEFAZOLIN 2 G: 10 INJECTION, POWDER, FOR SOLUTION INTRAVENOUS at 05:58

## 2021-11-04 RX ADMIN — DOCUSATE SODIUM 100 MG: 100 CAPSULE ORAL at 08:14

## 2021-11-04 RX ADMIN — SERTRALINE 25 MG: 50 TABLET, FILM COATED ORAL at 08:14

## 2021-11-04 RX ADMIN — VALSARTAN 160 MG: 160 TABLET, FILM COATED ORAL at 08:14

## 2021-11-04 RX ADMIN — Medication 10 ML: at 05:59

## 2021-11-04 RX ADMIN — OXYCODONE 10 MG: 5 TABLET ORAL at 09:27

## 2021-11-04 RX ADMIN — OXYCODONE 10 MG: 5 TABLET ORAL at 02:03

## 2021-11-04 RX ADMIN — ACETAMINOPHEN 1000 MG: 500 TABLET ORAL at 05:58

## 2021-11-04 RX ADMIN — OXYCODONE 10 MG: 5 TABLET ORAL at 05:58

## 2021-11-04 RX ADMIN — ACETAMINOPHEN 1000 MG: 500 TABLET ORAL at 00:12

## 2021-11-04 RX ADMIN — DIGOXIN 0.25 MG: 250 TABLET ORAL at 08:14

## 2021-11-04 NOTE — PROGRESS NOTES
Dual AVS reviewed with Izabel FITZGERALD RN. All medications reviewed individually with patient. Opportunities for questions and concerns provided. Patient to be discharged via (mode of transport ie. Car, ambulance or air transport) car. Patient's arm band appropriately discarded.

## 2021-11-04 NOTE — ROUTINE PROCESS
Bedside and Verbal shift change report given to DELMA Dowell RN (oncoming nurse) by [employer] RN (offgoing nurse). Report included the following information SBAR, Kardex, Intake/Output and MAR.

## 2021-11-04 NOTE — WOUND CARE
Wound Care Note: Wound Care into see patient because of Prevalence Skin Care & Pressure Relief Recommendations: 
Minimize layers of linen Pads under patient to optimize support surface and microclimate Turn/reposition approximately every 2 hours. Pillow Wedges Manage incontinence Promote continence; Skin Protective lotion to buttocks and sacrum daily and as needed with incontinence care Offload heels with pillows or offloading boots. Discussed above plan with patient Transition of Care: Consult wound care if needed

## 2021-11-04 NOTE — DISCHARGE INSTRUCTIONS
Dr. Hinds Purchase Instructions: Cervical Surgery    DO NOT take any NSAIDS if you had Fusion Surgery. ACTIVITIES:  1. Change positions every hour while you are awake. Walking is the best way to rebuild strength. 2. Wear your soft collar just for comfort if it is provided to you. You may remove if desired. 3. Sleep with your head elevated for 2-3 nights after surgery to prevent swelling. 4. Avoid strenuous activity, such as yard work, vacuuming, or lifting anything heavier than a gallon of milk. 5. Avoid strenuous activities, such as vacuuming, and do not lift anything heavier than 1 gallon of milk (or about 5-8 pounds). 1. Walk at a pace that avoids fatigue or severe pain. Do not try to walk several blocks the first day! 2. Follow-up with Dr. Real Olmos will be 7-10 days from surgery. BATHING and INCISION CARE:  1. The incision may be tender or feel numb: this is normal.   2. Keep the incision clean and dry. You may shower 3 days after surgery. Cover the dressing with saran wrap before getting in the shower. The incision is closed with sutures under the skin and glue on top. 3. Do not apply any lotions, ointments or oils on the incision. 4. Do not remove the dressing. Your dressing will be changed at your first post op appointment. If it comes loose or is damaged, dirty or wet before this appointment, call your home health nurse (if you are being seen by a nurse at home) or the office to have the dressing changed. 5. If you notice any excessive swelling, redness, or persistent drainage around the incision, notify the office immediately. CONSTIPATION:  1. Take a stool softener twice a day while you are taking a narcotic. 2. If you have not had a bowel movement within 3 days of surgery, you will need to use a laxative or suppository that can be obtained over the counter at your local pharmacy     ICE  1. Use ice on your neck and shoulders to decrease pain and swelling.   Do NOT use heat. MEDICATIONS:  1. If you had fusion surgery DO NOT TAKE non-steroidal anti-inflammatory (NSAID) medications, such as Motrin, Aleve, Advil Naprosyn, Ibuprofen or aspirin. 2. Take Tylenol/Acetaminophen every 4-6 hours for pain. Do not take more than 4000 mg each day. (Do not take Tylenol/Acetaminophen if you have liver problems). 3. Take your prescribed narcotic pain medication as needed for pain that is not tolerable. 4. Eat food before you take any pain medication to avoid nausea. 5. If you need a medication refill, please call the office during working hours at least 2 days before your prescription runs out. Do not wait until your bottle is empty to call for a refill. EATING & NUTRITION:  1. You can eat anything you can chew and make soft. Take small bites and avoid tough foods like meat and bread. 2. Painful swallowing is normal after surgery and may be experienced up to 2 weeks post-operatively. 3. You may obtain over the counter Chloraseptic spray. The more you swallow the better your throat will feel. 4. If at any point you can no longer swallow your own saliva, call Dr. Onelia Childers office right away. 5. Eat a healthy balanced diet to help your wound to heal. Protein supplements should be considered if you are eating less than 50% of your meal.   6. Drink plenty of water to stay hydrated. DRIVING & RETURN TO WORK:   1. You will be told at your follow up appointment if it is safe for you to drive or return to work and will be provided with a [id number]-note if needed (please ask). 2. NEVER drive while taking narcotic medication.     WHEN TO CALL THE OFFICE:   If you have severe pain unrelieved by the medications, new numbness or tingling in your legs;        If you have a fever of 101.0°F or greater    If you notice increased swelling, redness, or increased drainage from the incision     If you are not able to urinate   If you are not able to control your bowels    If you are unable to swallow your own saliva      [facility],  [address] number is [phone]. They are open from 8:00am to 5:00pm Mon - Fri. After 5:00pm, or on weekends/holidays, please call the answering service at [phone] for a call back.

## 2021-11-04 NOTE — DISCHARGE SUMMARY
Discharge/Transfer  Summary     Patient: Gita Matthew MRN: [id number]  SSN: [id number]    YOB: 1960  Age: 59 y.o. Sex: male       Admit Date: 11/3/2021    Discharge Date: 11/4/2021      Admission Diagnoses: Cervical stenosis of spinal canal [M48.02]    Discharge Diagnoses:   Problem List as of 11/4/2021 Date Reviewed: 11/3/2021          Codes Class Noted - Resolved    Cervical stenosis of spinal canal ICD-10-CM: M48.02  ICD-9-CM: 723.0  11/3/2021 - Present        Left arm weakness ICD-10-CM: R29.898  ICD-9-CM: 729.89  10/6/2021 - Present        Essential hypertension with goal blood pressure less than 140/90 ICD-10-CM: I10  ICD-9-CM: 401.9  8/18/2016 - Present        Dyslipidemia ICD-10-CM: E78.5  ICD-9-CM: 272.4  8/18/2016 - Present        PARISH (obstructive sleep apnea) ICD-10-CM: G47.33  ICD-9-CM: 327.23  8/18/2016 - Present        Diastolic CHF, chronic (HCC) ICD-10-CM: I50.32  ICD-9-CM: 428.32, 428.0  8/18/2016 - Present        Atrial fibrillation ([facility] [address] ICD-10-CM: I48.91  ICD-9-CM: 427.31  6/12/2016 - Present        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 ICD-10-CM: M50.20  ICD-9-CM: 722.0  5/8/2014 - Present        Spinal stenosis in cervical region ICD-10-CM: M48.02  ICD-9-CM: 723.0  5/8/2014 - Present        RESOLVED: TIA (transient ischemic attack) ICD-10-CM: G45.9  ICD-9-CM: 435.9  10/6/2021 - 10/6/2021        RESOLVED: Atrial fibrillation with rapid ventricular response ([facility] [address] ICD-10-CM: I48.91  ICD-9-CM: 427.31  6/12/2016 - 8/3/2021               Discharge Condition: Good    Hospital Course: benign      Disposition: home    Discharge Medications:   Current Discharge Medication List      START taking these medications    Details   !! oxyCODONE IR (Roxicodone) 5 mg immediate release tablet Take 1 Tablet by mouth every six (6) hours as needed for Pain for up to 7 days.  Max Daily Amount: 20 mg.  Qty: 28 Tablet, Refills: 0    Associated Diagnoses: S/P cervical spinal fusion       !! - Potential duplicate medications found. Please discuss with provider. CONTINUE these medications which have NOT CHANGED    Details   fenofibrate (LOFIBRA) 160 mg tablet take 1 tablet by mouth once daily WITH A MEAL      !! oxyCODONE IR (ROXICODONE) 5 mg immediate release tablet take 1 tablet by mouth every 6 hours if needed for pain      hydrALAZINE (APRESOLINE) 25 mg tablet two (2) times a day. Refills: 0      digoxin (LANOXIN) 0.25 mg tablet Take 1 Tab by mouth daily. Qty: 30 Tab, Refills: 1      nebivolol (BYSTOLIC) 10 mg tablet Take 1 Tab by mouth two (2) times a day. Qty: 60 Tab, Refills: 1      spironolactone (ALDACTONE) 25 mg tablet Take 1 Tab by mouth daily. Qty: 30 Tab, Refills: 1      valsartan (DIOVAN) 160 mg tablet Take 1 Tab by mouth daily. Qty: 30 Tab, Refills: 1      simvastatin (ZOCOR) 10 mg tablet Take  by mouth nightly. sertraline (ZOLOFT) 25 mg tablet Take  by mouth daily. Eliquis 5 mg tablet Take 5 mg by mouth daily. testosterone cypionate (DEPOTESTOTERONE CYPIONATE) 200 mg/mL injection every month. Refills: 0       !! - Potential duplicate medications found. Please discuss with provider. Follow-up Appointments   Procedures    FOLLOW UP VISIT Appointment in: Two Weeks     Standing Status:   Standing     Number of Occurrences:   1     Order Specific Question:   Appointment in     Answer:    Two Weeks       Signed By: Miladys Lynn MD     November 4, 2021

## 2021-11-04 NOTE — PROGRESS NOTES
Problem: Falls - Risk of  Goal: *Absence of Falls  Description: Document Wilfredo Barney Fall Risk and appropriate interventions in the flowsheet.   Outcome: Progressing Towards Goal  Note: Fall Risk Interventions:  Mobility Interventions: Patient to call before getting OOB, Utilize walker, cane, or other assistive device, PT Consult for mobility concerns    Mentation Interventions: Adequate sleep, hydration, pain control    Medication Interventions: Evaluate medications/consider consulting pharmacy, Patient to call before getting OOB, Teach patient to arise slowly    Elimination Interventions: Call light in reach, Urinal in reach              Problem: Patient Education: Go to Patient Education Activity  Goal: Patient/Family Education  Outcome: Progressing Towards Goal     Problem: Patient Education: Go to Patient Education Activity  Goal: Patient/Family Education  Outcome: Progressing Towards Goal     Problem: Patient Education: Go to Patient Education Activity  Goal: Patient/Family Education  Outcome: Progressing Towards Goal

## 2021-11-04 NOTE — ROUTINE PROCESS
Bedside and Verbal shift change report given to Yeni Pryor RN (oncoming nurse) by PALMIRA Tam RN (offgoing nurse). Report included the following information SBAR, Kardex, OR Summary, Intake/Output, MAR and Recent Results.

## 2021-11-04 NOTE — PROGRESS NOTES
2105-alert and oriented x 4. Lungs CTA on RA. BS active x 4 quads. IV access to left ac infusing D5 1/2 NS with 20 MEQ potassium without difficulty. Patient with honeycomb dressing to anterior neck covered by a soft collar. Wife at bedside. IRAM drain in place with serosanguinous drainage. SCD in place. Pain rated at 8/10. Dilaudid IV given. 2200-pain decreased to 7/10.  2237-Oxycodone 10 mg given for pain rated 7/10.  2300-Ativan given as sleep aid. Pain decreased to 5/10.    0203-Oxycodone 10 mg given for pain rated 7/10.    0353-CHG wipes refused at this time. 0558-Oxycodone 10 mg given for pain rated 7/10.  0650-pain decreased to 5/10. Shift summary-IV ATB given with no adverse effects. Pain controlled with PRN Oxycodone 10 mg approximately every 4 hours.

## 2021-11-04 NOTE — PROGRESS NOTES
1330 - Received patient from PACU in satisfactory condition. VSS. Assumed care of patient. Dual skin assessment completed with Bina Rodríguez RN. No pressure areas noted. Fall risk band in place. Patient is alert and oriented x 4. Patient is calm and cooperative. Denies numbness or tingling to BLE. Pedal pulses palpable and equal bilaterally. Capillary Refill < 3 seconds. Lung sounds clear bilaterally. Respirations even and unlabored. No use of accessory muscles. Educated on incentive spirometer and demonstrated proper use. Abdomen is soft and non-tender. Bowel sounds active to all quadrants. Patient has not voided post-operatively. No bladder distention evident. No complaints of bladder discomfort. Skin is warm, dry and skin color is appropriate to race. Honeycomb, gauze, and tegaderm dressing to anterior neck noted CDI. No other skin integrity issues present. Sequential compression device applied to BLE. IV intact to left AC and infusing without difficulty. Reports pain 6/10. Patient oriented to call bell use as well as bed use. Patient oriented to phone and how to order meals. Call bell within reach. Bed in low position. Three side rails up. 1415 - PRN oxycodone administered for pain to neck. 1806 - PRN oxycodone administered for pain to neck.

## 2021-11-04 NOTE — PROGRESS NOTES
Assumed care of pt at this time. Assessment complete. Pt alert and oriented x 4. Showing no signs of distress. Denies SOB and chest pain. Pt lungs clear bilaterally. Cap refill  less than 3 seconds. Pt denies numbness and tingling to all extremities. Stated pain 4 /10. Pt has 20G IV to L AC. Dressing to neck CDI . SCDS and TEDs applied to bilaterally. Pt encouraged to continue use of IS. Pt verbalized understanding. Ice pack in place. Fall risk armband intact. Call light and possessions within reach. Bed in low position with wheels locked. Will continue to monitor.      0906-Per Dr. Devon Mendez patient does not need antibodic as long as drain is out so patient is able to be discharged now

## 2021-11-04 NOTE — PROGRESS NOTES
Pt received to [address] [place] [address] for elective cervical procedure, independent with care, lives at home with his sylvester, pt was seen by PT and was cleared , NO PT recommendations at this time, pt will follow up with physician as recommended in 2 weeks. Care Management Interventions  PCP Verified by CM: Yes  Palliative Care Criteria Met (RRAT>21 & CHF Dx)?: No  Mode of Transport at Discharge:  Other (see comment) (sylvester)  Physical Therapy Consult: Yes  Occupational Therapy Consult: Yes  Support Systems: Spouse/Significant Other  Confirm Follow Up Transport: Family  Discharge Location  Discharge Placement: Home with home health (home)

## 2023-11-16 NOTE — PROGRESS NOTES
Patients top risk factors for readmission: functional physical ability   Interventions to address risk factors: Obtained and reviewed discharge summary and/or continuity of care documents and Assessment and support for treatment adherence and medication management-. Ambulatory Care Manager [facility]) contacted the patient by telephone to perform post ED discharge assessment. Verified name and  with patient as identifiers. Provided introduction to self, and explanation of the ACM role. ACM reviewed discharge instructions, medical action plan and red flags with patient who verbalized understanding. Were discharge instructions available to patient? yes. Reviewed appropriate site of care based on symptoms and resources available to patient including: PCP and Specialist. Patient given an opportunity to ask questions and does not have any further questions or concerns at this time. The patient agrees to contact the PCP office for questions related to their healthcare. Medication reconciliation was performed with patient, who verbalizes understanding of administration of home medications. Advised obtaining a 90-day supply of all daily and as-needed medications. Referral to Pharm D needed: no     Covid Risk Education    Educated patient about risk for severe COVID-19 due to risk factors according to CDC guidelines. ACM reviewed discharge instructions, medical action plan and red flag symptoms with the patient who verbalized understanding. Discussed COVID vaccination status: yes. Education provided on COVID-19 vaccination as appropriate. Discussed exposure protocols and quarantine with CDC Guidelines. Patient was given an opportunity to verbalize any questions and concerns and agrees to contact ACM or health care provider for questions related to their healthcare.     Was patient discharged with a pulse oximeter? no. Discussed and confirmed pulse oximeter discharge instructions and when to notify provider or seek emergency care. Discussed follow-up appointments. If no appointment was previously scheduled, appointment scheduling offered: n/a-appointments already scheduled. [facility] follow up appointment(s): No future appointments. Non-[facility] follow up appointment(s): n/a    Plan for follow-up call in 5-7 days based on severity of symptoms and risk factors. Plan for next call: follow up appointment-. ACM provided contact information for future needs. Dr. Lucas

## (undated) DEVICE — REM POLYHESIVE ADULT PATIENT RETURN ELECTRODE: Brand: VALLEYLAB

## (undated) DEVICE — PIN SAFETY 2

## (undated) DEVICE — 1010 S-DRAPE TOWEL DRAPE 10/BX: Brand: STERI-DRAPE™

## (undated) DEVICE — WATERPROOF, BACTERIA PROOF DRESSING WITH ABSORBENT SEE THROUGH PAD: Brand: OPSITE POST-OP VISIBLE 20X10CM CTN 20

## (undated) DEVICE — INSULATED BLADE ELECTRODE: Brand: EDGE

## (undated) DEVICE — PACK PROCEDURE SURG ANTR CERV DISCECTOMY

## (undated) DEVICE — SPONGE HEMSTAT SURGCEL 2X4IN -- SURGIFOAM

## (undated) DEVICE — SYR 3ML LL TIP 1/10ML GRAD --

## (undated) DEVICE — GARMENT,MEDLINE,DVT,INT,CALF,MED, GEN2: Brand: MEDLINE

## (undated) DEVICE — ANTERIOR CERVICAL POSITIONING SYSTEM SINGLE USE KIT BOX OF 5: Brand: BONEFOAM

## (undated) DEVICE — SOL IRRIGATION INJ NACL 0.9% 500ML BTL

## (undated) DEVICE — SUTURE MCRYL SZ 3-0 L27IN ABSRB UD L26MM SH 1/2 CIR Y416H

## (undated) DEVICE — 3.0MM PRECISION NEURO (MATCH HEAD)

## (undated) DEVICE — TABLE COVER: Brand: CONVERTORS

## (undated) DEVICE — SYSTEM SKIN CLSR 22CM DERMBND PRINEO

## (undated) DEVICE — SUT VCRL + 2-0 27IN CT2 UD -- 36/BX

## (undated) DEVICE — COLLAR FOAM CERV LG 3X18.5IN --

## (undated) DEVICE — PREP SKN CLORAPREP ORN STRL --

## (undated) DEVICE — DRAIN SURG L1 4IN 7MM RND HUBLESS

## (undated) DEVICE — SYR 10ML LUER LOK 1/5ML GRAD --

## (undated) DEVICE — APPLIER CLP M L11IN TI MULT RNG HNDL 30 CLP STR LIGACLP

## (undated) DEVICE — ROUND DISSECTORS: Brand: DEROYAL

## (undated) DEVICE — CONTAINER,SPEC,PNEUM TUBE,3OZ,STRL PATH: Brand: MEDLINE

## (undated) DEVICE — GLOVE ORANGE PI 8   MSG9080

## (undated) DEVICE — RESERVOIR,SUCTION,100CC,SILICONE: Brand: MEDLINE

## (undated) DEVICE — Device

## (undated) DEVICE — WATERPROOF, BACTERIA PROOF DRESSING WITH ABSORBENT SEE THROUGH PAD: Brand: OPSITE POST-OP VISIBLE 15X10CM CTN 20

## (undated) DEVICE — SURGIFOAM SPNG SZ 100

## (undated) DEVICE — INTENDED FOR TISSUE SEPARATION, AND OTHER PROCEDURES THAT REQUIRE A SHARP SURGICAL BLADE TO PUNCTURE OR CUT.: Brand: BARD-PARKER SAFETY BLADES SIZE 10, STERILE

## (undated) DEVICE — BONE WAX WHITE: Brand: BONE WAX WHITE

## (undated) DEVICE — MARKER,SKIN,WI/RULER AND LABELS: Brand: MEDLINE